# Patient Record
Sex: MALE | Race: WHITE | NOT HISPANIC OR LATINO | Employment: OTHER | URBAN - METROPOLITAN AREA
[De-identification: names, ages, dates, MRNs, and addresses within clinical notes are randomized per-mention and may not be internally consistent; named-entity substitution may affect disease eponyms.]

---

## 2017-03-31 DIAGNOSIS — K21.00 GASTROESOPHAGEAL REFLUX DISEASE WITH ESOPHAGITIS: ICD-10-CM

## 2017-04-03 RX ORDER — DEXLANSOPRAZOLE 60 MG/1
CAPSULE, DELAYED RELEASE ORAL
Qty: 30 CAPSULE | Refills: 0 | Status: SHIPPED | OUTPATIENT
Start: 2017-04-03 | End: 2017-04-28 | Stop reason: SDUPTHER

## 2017-04-28 DIAGNOSIS — K21.00 GASTROESOPHAGEAL REFLUX DISEASE WITH ESOPHAGITIS: ICD-10-CM

## 2017-04-28 RX ORDER — DEXLANSOPRAZOLE 60 MG/1
60 CAPSULE, DELAYED RELEASE ORAL DAILY
Qty: 30 CAPSULE | Refills: 0 | Status: SHIPPED | OUTPATIENT
Start: 2017-04-28 | End: 2017-05-26 | Stop reason: SDUPTHER

## 2017-05-26 DIAGNOSIS — K21.00 GASTROESOPHAGEAL REFLUX DISEASE WITH ESOPHAGITIS: ICD-10-CM

## 2017-05-26 RX ORDER — DEXLANSOPRAZOLE 60 MG/1
CAPSULE, DELAYED RELEASE ORAL
Qty: 30 CAPSULE | Refills: 4 | Status: SHIPPED | OUTPATIENT
Start: 2017-05-26 | End: 2017-11-29 | Stop reason: SDUPTHER

## 2017-07-18 ENCOUNTER — OFFICE VISIT (OUTPATIENT)
Dept: INTERNAL MEDICINE | Facility: CLINIC | Age: 57
End: 2017-07-18

## 2017-07-18 ENCOUNTER — HOSPITAL ENCOUNTER (OUTPATIENT)
Dept: GENERAL RADIOLOGY | Facility: HOSPITAL | Age: 57
Discharge: HOME | End: 2017-07-18
Admitting: INTERNAL MEDICINE

## 2017-07-18 VITALS
OXYGEN SATURATION: 97 % | DIASTOLIC BLOOD PRESSURE: 80 MMHG | SYSTOLIC BLOOD PRESSURE: 134 MMHG | HEART RATE: 81 BPM | BODY MASS INDEX: 35 KG/M2 | WEIGHT: 223 LBS | HEIGHT: 67 IN

## 2017-07-18 DIAGNOSIS — M54.50 CHRONIC RIGHT-SIDED LOW BACK PAIN WITHOUT SCIATICA: ICD-10-CM

## 2017-07-18 DIAGNOSIS — G89.29 CHRONIC RIGHT-SIDED LOW BACK PAIN WITHOUT SCIATICA: ICD-10-CM

## 2017-07-18 DIAGNOSIS — G57.12 MERALGIA PARESTHETICA OF LEFT SIDE: Primary | ICD-10-CM

## 2017-07-18 DIAGNOSIS — K21.00 GASTROESOPHAGEAL REFLUX DISEASE WITH ESOPHAGITIS: Chronic | ICD-10-CM

## 2017-07-18 DIAGNOSIS — Z11.59 NEED FOR HEPATITIS C SCREENING TEST: ICD-10-CM

## 2017-07-18 PROCEDURE — 99214 OFFICE O/P EST MOD 30 MIN: CPT | Performed by: INTERNAL MEDICINE

## 2017-07-18 PROCEDURE — 72110 X-RAY EXAM L-2 SPINE 4/>VWS: CPT

## 2017-07-18 NOTE — PROGRESS NOTES
07/18/2017    Patient Information  Tito Jackson                                                                                          [address]  [facility] [address]      1960  [phone]      Chief Complaint:     Complaining of numbness and tingling of the left thigh.  Complaining of lower back pain.    History of Present Illness:    Patient with a history of esophageal reflux with esophagitis, dyslipidemia, environmental allergies and asthma, migraine headaches.  He presents today with what appears to be 2 unrelated issues and this will be described in detail below.  Past medical history reviewed and updated where necessary including health maintenance parameters.  This reveals she needs a TDaP which we will do today.  He also needs hepatitis C screening which we will add to his lab work for his physical appointment in October.    The history regarding left meralgia paresthetica and chronic lower back pain:    07/18/2017--patient presents with approximately 3-4 month history of numbness and paresthesias involving his left thigh anterior laterally and a classic distribution consistent with meralgia paresthetica.  He has been having some lower back pain as well and was concerned that he might have a pinched nerve from that.  His son has been ill in the hospital and he's been sleeping at the hospital under less than ideal conditions.  I gave him some information regarding meralgia paresthetica and recommended weight loss.  This should help his back problem as well.  In regards to his chronic lower back pain, it is more right sided and described as a pinching sensation and is somewhat positional.  He can find certain positions which seemed to provide some relief and other positions and activities seem to aggravate it, particularly prolonged standing, walking, or playing golf.  He does not give a history consistent with radiculopathy.  I think weight loss would be the first course of  action.  Physical therapy is also another option.  In regards to medication he may benefit from a nonsteroidal.  He does have an esophageal reflux and takes Dexilant and we could temporarily discontinue that and use Vimovo 500/20, one by mouth twice a day.  Patient wants to defer therapy for the present.  I have asked him to give me a call should he change his mind.  We will obtain x-ray of the lumbar spine to assess for degenerative disc disease/arthritis.  I do feel that his left-sided symptoms are related to pressure on the lateral femoral cutaneous nerve and not coming from the spine itself.  However, if symptoms persist and certainly if they worsen, we may need to pursue further evaluation such as an MRI.    Review of Systems   Constitution: Negative.   HENT: Negative.    Eyes: Negative.    Cardiovascular: Negative.    Respiratory: Negative.    Endocrine: Negative.    Hematologic/Lymphatic: Negative.    Skin: Negative.    Musculoskeletal: Positive for back pain.   Gastrointestinal: Negative.    Genitourinary: Negative.    Neurological: Positive for numbness and paresthesias.   Psychiatric/Behavioral: Negative.    Allergic/Immunologic: Negative.        Active Problems:    Patient Active Problem List   Diagnosis   • Allergic rhinitis   • Diverticulosis of colon   • Dyslipidemia   • Gastroesophageal reflux disease with esophagitis   • Impaired fasting glucose   • Migraine headaches   • Mild intermittent asthma   • Multiple environmental allergies   • Vitamin D deficiency   • Therapeutic drug monitoring   • Routine physical examination   • Meralgia paresthetica of left side   • Chronic right-sided low back pain without sciatica         Past Medical History:   Diagnosis Date   • History of actinic keratosis 12/15/2008    12/15/2008--patient had a total of 7 lesions removed from his face and clavicle. Pathology returned seborrheic keratoses inflamed on the clavicle.   • History of cardiovascular stress test  04/29/2011 04/29/2011--maximum Noel protocol treadmill is negative for ischemia.   • History of chest x-ray 12/01/2006 12/01/2006--chest x-ray revealed lungs moderately well expanded and clear except for a tiny calcified granuloma in the lower left lung. Heart hilar structures are normal and no acute abnormality seen.   • History of esophagogastroduodenoscopy 02/27/2009 02/27/2009--EGD revealed LA grade a esophagitis with no evidence of bleeding. Biopsies taken. A small hiatal hernia was present. Diffuse mildly erythematous mucosa with no bleeding was found in the stomach. Biopsies taken. Biopsy sent for H. pylori. No gross lesions were noted in the entire duodenum. Random gastric biopsies returned fragments of unremarkablle gastric mucosa. Hyperplastic polypoid    • History of herpes genitalis Approximately 2001    Approximately 2001--patient had an initial outbreak of genital herpes and has not had a recurrent since.   • History of pneumonia 2002 08/29/2002--bronchoscopy was performed with brushings and biopsies which were negative for malignant cells. Watching showed only normal respiratory breana. AFB and fungal smears negative. The thoracic surgeon wanted to do a VATS procedure and patient refused. His adenopathy as well as infiltrate subsequently resolved. No further recurrence.  08/09/2002--CT scan of the chest performed for abnormal c         Past Surgical History:   Procedure Laterality Date   • APPENDECTOMY  Childhood    As a child   • BRONCHOSCOPY  08/29/2002 08/29/2002--bronchoscopy was performed with brushings and biopsies which were negative for malignant cells. Watching showed only normal respiratory breana. AFB and fungal smears negative. The thoracic surgeon wanted to do a VATS procedure and patient refused. His adenopathy as well as infiltrate subsequently resolved. No further recurrence.  08/09/2002--CT scan of the chest performed for abnormal c   • COLONOSCOPY  02/27/2009     02/27/2009--colonoscopy reveals multiple large and small mouth diverticula in sigmoid colon. The rest of the colon was normal.   • DACRYOCYSTORHINOSTOMY Right 07/12/2012 07/12/2012--dacryocystorhinostomy of the right eye with silicone intubation of lacrimal drainage system. Patient's symptoms have totally resolved   • ESOPHAGOSCOPY / EGD  02/27/2009 02/27/2009--EGD revealed LA grade a esophagitis with no evidence of bleeding. Biopsies taken. A small hiatal hernia was present. Diffuse mildly erythematous mucosa with no bleeding was found in the stomach. Biopsies taken. Biopsy sent for H. pylori. No gross lesions were noted in the entire duodenum. Random gastric biopsies returned fragments of unremarkablle gastric mucosa. Hyperplastic polypoid    • SKIN SURGERY  12/15/2008    12/15/2008--patient had a total of 7 lesions removed from his face and clavicle. Pathology returned seborrheic keratoses inflamed on the clavicle.   • TONSILLECTOMY  Childhood    As a child         No Known Allergies        Current Outpatient Prescriptions:   •  albuterol (PROVENTIL HFA;VENTOLIN HFA) 108 (90 BASE) MCG/ACT inhaler, Proventil  (90 Base) MCG/ACT Inhalation Aerosol Solution; Patient Sig: Proventil  (90 Base) MCG/ACT Inhalation Aerosol Solution 1-2 puffs every 4 hours when necessary asthma; 1; 5; 23-Jan-2014; Active, Disp: , Rfl:   •  DEXILANT 60 MG capsule, TAKE 1 CAPSULE BY MOUTH DAILY., Disp: 30 capsule, Rfl: 4      Family History   Problem Relation Age of Onset   • Diabetes Father      Type 2         Social History     Social History   • Marital status:      Spouse name: N/A   • Number of children: N/A   • Years of education: N/A     Occupational History   • // for [employer]      Social History Main Topics   • Smoking status: Never Smoker   • Smokeless tobacco: Never Used   • Alcohol use Yes      Comment: Minimum   • Drug use: No   • Sexual activity: Yes     Partners:  "Female     Other Topics Concern   • Not on file     Social History Narrative         Vitals:    07/18/17 0730   BP: 134/80   BP Location: Right arm   Patient Position: Sitting   Pulse: 81   SpO2: 97%   Weight: 223 lb (101 kg)   Height: 67\" (170.2 cm)          Physical Exam:    General: Alert and oriented x 3. Obese.  No acute distress.  Normal affect.  HEENT: Pupils equal, round, reactive to light; extraocular movements intact; sclerae nonicteric; pharynx, ear canals and TMs normal.  Neck: Without JVD, thyromegaly, bruit, or adenopathy.  Lungs: Clear to auscultation in all fields.  Heart: Regular rate and rhythm without murmur, rub, gallop, or click.  Abdomen: Soft, nontender, without hepatosplenomegaly or hernia.  Bowel sounds normal.  : Deferred.  Rectal: Deferred.  Extremities: Without clubbing, cyanosis, edema, or pulse deficit.  Neurologic: Intact without focal deficit.  Normal station and gait observed during ingress and egress from the examination room.  Skin: Without significant lesion.  Straight leg raising is negative bilaterally.  Musculoskeletal: Unremarkable.      Lab/other results:      Assessment/Plan:     Diagnosis Plan   1. Meralgia paresthetica of left side     2. Chronic right-sided low back pain without sciatica     3. Gastroesophageal reflux disease with esophagitis         Patient presents with a history that is classic for meralgia paresthetica.  He also has chronic lower back pain which is somewhat complicating the clinical picture but I do think that these are 2 separate problems.  I don't feel this represents a lumbar radiculopathy.  At any rate, radiographic imaging of the lumbar spine is indicated.  I think patient would benefit from nonsteroidal anti-inflammatory agent but he does have esophageal reflux with a history of esophagitis.    Plan is as follows: X-ray lumbar spine.  Recommend weight loss.  Vimovo 500/20, one by mouth twice a day.  Patient can hold the Dexilant while taking " the Vimovo although it certainly wouldn't hurt anything he continued it.  He will follow-up on the phone for the results of the x-rays.  I've asked him to contact me for any worsening of his symptoms or any significant changes.  Otherwise, we will see him in October for his annual exam.  If he decides to do physical therapy and give me a call and we can place the referral.  On second thought, I went ahead and gave him a hand written prescription for physical therapy and that way he can make his own appointment if he decides to do so.          Procedures

## 2017-10-17 DIAGNOSIS — Z11.59 NEED FOR HEPATITIS C SCREENING TEST: ICD-10-CM

## 2017-10-20 LAB
25(OH)D3+25(OH)D2 SERPL-MCNC: 17.1 NG/ML (ref 30–100)
ALBUMIN SERPL-MCNC: 4.5 G/DL (ref 3.5–5.2)
ALBUMIN/GLOB SERPL: 1.7 G/DL
ALP SERPL-CCNC: 49 U/L (ref 39–117)
ALT SERPL-CCNC: 32 U/L (ref 1–41)
APPEARANCE UR: CLEAR
AST SERPL-CCNC: 25 U/L (ref 1–40)
BILIRUB SERPL-MCNC: 0.4 MG/DL (ref 0.1–1.2)
BILIRUB UR QL STRIP: NEGATIVE
BUN SERPL-MCNC: 16 MG/DL (ref 6–20)
BUN/CREAT SERPL: 13 (ref 7–25)
CALCIUM SERPL-MCNC: 9.5 MG/DL (ref 8.6–10.5)
CHLORIDE SERPL-SCNC: 103 MMOL/L (ref 98–107)
CHOLEST SERPL-MCNC: 145 MG/DL (ref 100–199)
CO2 SERPL-SCNC: 23.9 MMOL/L (ref 22–29)
COLOR UR: YELLOW
CREAT SERPL-MCNC: 1.23 MG/DL (ref 0.76–1.27)
ERYTHROCYTE [DISTWIDTH] IN BLOOD BY AUTOMATED COUNT: 14 % (ref 11.5–14.5)
GFR SERPLBLD CREATININE-BSD FMLA CKD-EPI: 60 ML/MIN/1.73
GFR SERPLBLD CREATININE-BSD FMLA CKD-EPI: 73 ML/MIN/1.73
GLOBULIN SER CALC-MCNC: 2.6 GM/DL
GLUCOSE SERPL-MCNC: 99 MG/DL (ref 65–99)
GLUCOSE UR QL: NEGATIVE
HBA1C MFR BLD: 5.42 % (ref 4.8–5.6)
HCT VFR BLD AUTO: 44.1 % (ref 40.4–52.2)
HCV AB S/CO SERPL IA: 0.1 S/CO RATIO (ref 0–0.9)
HDL SERPL-SCNC: 25.8 UMOL/L
HDLC SERPL-MCNC: 29 MG/DL
HGB BLD-MCNC: 14.5 G/DL (ref 13.7–17.6)
HGB UR QL STRIP: NEGATIVE
KETONES UR QL STRIP: NEGATIVE
LDL SERPL QN: 19.6 NM
LDL SERPL-SCNC: 1125 NMOL/L
LDL SMALL SERPL-SCNC: 877 NMOL/L
LDLC SERPL CALC-MCNC: 58 MG/DL (ref 0–99)
LEUKOCYTE ESTERASE UR QL STRIP: NEGATIVE
LP-IR SCORE SERPL: 75
MCH RBC QN AUTO: 29.4 PG (ref 27–32.7)
MCHC RBC AUTO-ENTMCNC: 32.9 G/DL (ref 32.6–36.4)
MCV RBC AUTO: 89.3 FL (ref 79.8–96.2)
NITRITE UR QL STRIP: NEGATIVE
PH UR STRIP: 6 [PH] (ref 5–8)
PLATELET # BLD AUTO: 197 10*3/MM3 (ref 140–500)
POTASSIUM SERPL-SCNC: 4.7 MMOL/L (ref 3.5–5.2)
PROT SERPL-MCNC: 7.1 G/DL (ref 6–8.5)
PROT UR QL STRIP: NEGATIVE
PSA SERPL-MCNC: 0.79 NG/ML (ref 0–4)
RBC # BLD AUTO: 4.94 10*6/MM3 (ref 4.6–6)
SODIUM SERPL-SCNC: 142 MMOL/L (ref 136–145)
SP GR UR: 1.02 (ref 1–1.03)
T3FREE SERPL-MCNC: 3.3 PG/ML (ref 2–4.4)
T4 FREE SERPL-MCNC: 1.19 NG/DL (ref 0.93–1.7)
TRIGL SERPL-MCNC: 289 MG/DL (ref 0–149)
TSH SERPL DL<=0.005 MIU/L-ACNC: 1.7 MIU/ML (ref 0.27–4.2)
UROBILINOGEN UR STRIP-MCNC: NORMAL MG/DL
WBC # BLD AUTO: 6.17 10*3/MM3 (ref 4.5–10.7)

## 2017-10-25 ENCOUNTER — OFFICE VISIT (OUTPATIENT)
Dept: INTERNAL MEDICINE | Facility: CLINIC | Age: 57
End: 2017-10-25

## 2017-10-25 VITALS
WEIGHT: 222.4 LBS | SYSTOLIC BLOOD PRESSURE: 122 MMHG | HEIGHT: 67 IN | BODY MASS INDEX: 34.91 KG/M2 | HEART RATE: 73 BPM | OXYGEN SATURATION: 99 % | DIASTOLIC BLOOD PRESSURE: 80 MMHG

## 2017-10-25 DIAGNOSIS — M54.50 CHRONIC RIGHT-SIDED LOW BACK PAIN WITHOUT SCIATICA: Chronic | ICD-10-CM

## 2017-10-25 DIAGNOSIS — G89.29 CHRONIC RIGHT-SIDED LOW BACK PAIN WITHOUT SCIATICA: Chronic | ICD-10-CM

## 2017-10-25 DIAGNOSIS — G57.12 MERALGIA PARESTHETICA OF LEFT SIDE: Chronic | ICD-10-CM

## 2017-10-25 DIAGNOSIS — Z00.00 ROUTINE PHYSICAL EXAMINATION: Primary | ICD-10-CM

## 2017-10-25 DIAGNOSIS — J45.20 MILD INTERMITTENT ASTHMA WITHOUT COMPLICATION: Chronic | ICD-10-CM

## 2017-10-25 DIAGNOSIS — R73.01 IMPAIRED FASTING GLUCOSE: Chronic | ICD-10-CM

## 2017-10-25 DIAGNOSIS — E78.5 DYSLIPIDEMIA: Chronic | ICD-10-CM

## 2017-10-25 DIAGNOSIS — Z91.09 MULTIPLE ENVIRONMENTAL ALLERGIES: Chronic | ICD-10-CM

## 2017-10-25 DIAGNOSIS — Z51.81 THERAPEUTIC DRUG MONITORING: ICD-10-CM

## 2017-10-25 DIAGNOSIS — IMO0002 CHRONIC MIGRAINE: Chronic | ICD-10-CM

## 2017-10-25 DIAGNOSIS — K21.00 GASTROESOPHAGEAL REFLUX DISEASE WITH ESOPHAGITIS: Chronic | ICD-10-CM

## 2017-10-25 DIAGNOSIS — E55.9 VITAMIN D DEFICIENCY: Chronic | ICD-10-CM

## 2017-10-25 PROCEDURE — 99396 PREV VISIT EST AGE 40-64: CPT | Performed by: INTERNAL MEDICINE

## 2017-10-25 NOTE — PROGRESS NOTES
10/25/2017    Patient Information  Tito Jackson                                                                                          [address]  [facility] [id number]      1960  [phone]      Chief Complaint:     Routine annual physical examination and follow-up lab work.  No new acute complaints.    History of Present Illness:    Patient with a history of mild impaired fasting glucose, dyslipidemia, esophageal reflux with esophagitis, migraine headaches, mild intermittent asthma and allergy, chronic lower back pain and left meralgia paresthetica.  He presents today for his routine annual exam and follow-up lab work.  He is tolerating his current medications well and has no new acute complaints.  Past medical history reviewed and updated where necessary including health maintenance parameters.  This reveals he is up-to-date.    Review of Systems   Constitution: Negative.   HENT: Negative.    Eyes: Negative.    Cardiovascular: Negative.    Respiratory: Negative.    Endocrine: Negative.    Hematologic/Lymphatic: Negative.    Skin: Negative.    Musculoskeletal: Negative.    Gastrointestinal: Negative.    Genitourinary: Negative.    Neurological: Negative.    Psychiatric/Behavioral: Negative.    Allergic/Immunologic: Negative.        Active Problems:    Patient Active Problem List   Diagnosis   • Allergic rhinitis   • Diverticulosis of colon   • Dyslipidemia   • Gastroesophageal reflux disease with esophagitis   • Impaired fasting glucose   • Migraine headaches   • Mild intermittent asthma   • Multiple environmental allergies   • Vitamin D deficiency   • Therapeutic drug monitoring   • Routine physical examination   • Meralgia paresthetica of left side   • Chronic right-sided low back pain without sciatica         Past Medical History:   Diagnosis Date   • Allergic rhinitis 2/23/2016    Patient had remote allergy testing as a child but never received immunotherapy except shots for poison  ivy.   • Diverticulosis of colon 2/27/2009 02/27/2009--colonoscopy reveals multiple large and small mouth diverticula in sigmoid colon. The rest of the colon was normal.   • Dyslipidemia 8/20/2015 08/20/2015--NMR reveals a total cholesterol 140. Triglycerides elevated at 203. LDL particle number excellent at 965. Small LDL particle number elevated at 747. HDL particle number low at 26.0.   • Gastroesophageal reflux disease with esophagitis 2/27/2009 02/27/2009--EGD revealed LA grade a esophagitis with no evidence of bleeding. Biopsies taken. A small hiatal hernia was present. Diffuse mildly erythematous mucosa with no bleeding was found in the stomach. Biopsies taken. Biopsy sent for H. pylori. No gross lesions were noted in the entire duodenum. Random gastric biopsies returned fragments of unremarkablle gastric mucosa. Hyperplastic polypoid gastric mucosa, fundic type. H pylori negative. Distal esophagitis biopsies revealed focus of acute inflammation with evidence of mucosal erosion/ulcer. Strips of squamous epithelium with focal parakeratosis. Special stains for fungi were negative.   • History of actinic keratosis 12/15/2008    12/15/2008--patient had a total of 7 lesions removed from his face and clavicle. Pathology returned seborrheic keratoses inflamed on the clavicle.   • History of cardiovascular stress test 04/29/2011 04/29/2011--maximum Noel protocol treadmill is negative for ischemia.   • History of chest x-ray 12/01/2006 12/01/2006--chest x-ray revealed lungs moderately well expanded and clear except for a tiny calcified granuloma in the lower left lung. Heart hilar structures are normal and no acute abnormality seen.   • History of esophagogastroduodenoscopy 02/27/2009 02/27/2009--EGD revealed LA grade a esophagitis with no evidence of bleeding. Biopsies taken. A small hiatal hernia was present. Diffuse mildly erythematous mucosa with no bleeding was found in the stomach. Biopsies  taken. Biopsy sent for H. pylori. No gross lesions were noted in the entire duodenum. Random gastric biopsies returned fragments of unremarkablle gastric mucosa. Hyperplastic polypoid    • History of herpes genitalis Approximately 2001    Approximately 2001--patient had an initial outbreak of genital herpes and has not had a recurrent since.   • History of pneumonia 2002 08/29/2002--bronchoscopy was performed with brushings and biopsies which were negative for malignant cells. Watching showed only normal respiratory breana. AFB and fungal smears negative. The thoracic surgeon wanted to do a VATS procedure and patient refused. His adenopathy as well as infiltrate subsequently resolved. No further recurrence.  08/09/2002--CT scan of the chest performed for abnormal c   • Impaired fasting glucose 8/20/2015 08/20/2015--serum glucose 109. Recommend diet, weight loss, exercise.   • Migraine headaches 2/23/2016   • Mild intermittent asthma 2/23/2016   • Multiple environmental allergies 2/23/2016    Patient had remote allergy testing as a child but never received immunotherapy except shots for poison ivy.   • Vitamin D deficiency 2/23/2016         Past Surgical History:   Procedure Laterality Date   • APPENDECTOMY  Childhood    As a child   • BRONCHOSCOPY  08/29/2002 08/29/2002--bronchoscopy was performed with brushings and biopsies which were negative for malignant cells. Watching showed only normal respiratory breana. AFB and fungal smears negative. The thoracic surgeon wanted to do a VATS procedure and patient refused. His adenopathy as well as infiltrate subsequently resolved. No further recurrence.  08/09/2002--CT scan of the chest performed for abnormal c   • COLONOSCOPY  02/27/2009 02/27/2009--colonoscopy reveals multiple large and small mouth diverticula in sigmoid colon. The rest of the colon was normal.   • DACRYOCYSTORHINOSTOMY Right 07/12/2012 07/12/2012--dacryocystorhinostomy of the right eye with  "silicone intubation of lacrimal drainage system. Patient's symptoms have totally resolved   • ESOPHAGOSCOPY / EGD  02/27/2009 02/27/2009--EGD revealed LA grade a esophagitis with no evidence of bleeding. Biopsies taken. A small hiatal hernia was present. Diffuse mildly erythematous mucosa with no bleeding was found in the stomach. Biopsies taken. Biopsy sent for H. pylori. No gross lesions were noted in the entire duodenum. Random gastric biopsies returned fragments of unremarkablle gastric mucosa. Hyperplastic polypoid    • SKIN SURGERY  12/15/2008    12/15/2008--patient had a total of 7 lesions removed from his face and clavicle. Pathology returned seborrheic keratoses inflamed on the clavicle.   • TONSILLECTOMY  Childhood    As a child         No Known Allergies        Current Outpatient Prescriptions:   •  albuterol (PROVENTIL HFA;VENTOLIN HFA) 108 (90 BASE) MCG/ACT inhaler, Proventil  (90 Base) MCG/ACT Inhalation Aerosol Solution; Patient Sig: Proventil  (90 Base) MCG/ACT Inhalation Aerosol Solution 1-2 puffs every 4 hours when necessary asthma; 1; 5; 23-Jan-2014; Active, Disp: , Rfl:   •  DEXILANT 60 MG capsule, TAKE 1 CAPSULE BY MOUTH DAILY., Disp: 30 capsule, Rfl: 4      Family History   Problem Relation Age of Onset   • Diabetes Father      Type 2         Social History     Social History   • Marital status:      Spouse name: N/A   • Number of children: N/A   • Years of education: N/A     Occupational History   • // for [employer]      Social History Main Topics   • Smoking status: Never Smoker   • Smokeless tobacco: Never Used   • Alcohol use Yes      Comment: Minimum   • Drug use: No   • Sexual activity: Yes     Partners: Female     Other Topics Concern   • Not on file     Social History Narrative         Vitals:    10/25/17 0822   BP: 122/80   Pulse: 73   SpO2: 99%   Weight: 222 lb 6.4 oz (101 kg)   Height: 67\" (170.2 cm)          Physical " Exam:    General: Alert and oriented x 3, with appropriate affect; no acute distress.  HEENT: pupils equal, round, and reactive to light; extraocular movements intact; sclera nonicteric; nasal mucosa normal; pharynx normal; tympanic membranes and ear canals normal.  Neck: without JVD, thyromegaly, bruit, or adenopathy.  Lungs: clear to auscultation in all fields.  Heart: auscultation reveals regular rate and rhythm without murmur, rub, gallop, or click.  Abdomen: is soft and nontender, without hepato-splenomegaly, mass or hernia. Normal bowel sounds; .  Urologic exam: reveals normal male genitalia without testicular mass or penile/scrotal lesion.  Digital rectal exam and Prostate: deferred.  Extremities: are without clubbing, cyanosis, or edema.  Vascular: no signs of peripheral arterial disease or venous insufficiency/varicosities.  Neurological: intact without focal deficit, including cranial and peripheral nerves.  Station and gait observed to be normal during ingress and egress from the examination area.  Sensation and deep tendon reflexes tested if clinically indicated and are normal.  Musculoskeletal: exam is normal, without signs of synovitis, significant degeneration or deformity. Skin examination: without rash or significant lesions.      Lab/other results:    NMR reveals total cholesterol 145.  Triglycerides are mildly elevated at 289.  LDL particle number mildly elevated at 1125.  Small LDL particle number elevated at 877.  HDL particle number is low at 25.8.  CMP normal.  CBC normal.  Hemoglobin A1c 5.42.  Thyroid function tests are normal.  PSA is excellent at 0.789.  Vitamin D low at 70.1.  Hepatitis C antibody screening is negative.    Assessment/Plan:     Diagnosis Plan   1. Routine physical examination     2. Impaired fasting glucose     3. Dyslipidemia     4. Gastroesophageal reflux disease with esophagitis     5. Migraine headaches     6. Mild intermittent asthma without complication     7.  Multiple environmental allergies     8. Meralgia paresthetica of left side     9. Chronic right-sided low back pain without sciatica     10. Vitamin D deficiency     11. Therapeutic drug monitoring         Patient presents with essentially normal annual exam except following issues: He is very mild impaired fasting glucose that does not require medication.  He has dyslipidemia consisting of elevated LDL particle number and elevated small LDL particle number associated with high triglycerides and low HDL particle number.  Patient would rather not take medication at this time and instead would like to work on diet and weight loss.  I do think that this is reasonable given the mild severity of the dyslipidemia.  Reflux controlled with Dexilant.  Migraine headaches and asthma as well as allergies haven't been mild and stable.  Neuralgia paresthetica of the left thigh is intermittent and not severe.  His back pain actually is better.  He does have a vitamin D deficiency and should start vitamin D supplementation.    Plan is as follows: Vitamin D 5000 units per day.  No other changes.  Patient will follow-up in one year for his annual exam or follow-up as needed.      Procedures

## 2017-11-29 DIAGNOSIS — K21.00 GASTROESOPHAGEAL REFLUX DISEASE WITH ESOPHAGITIS: ICD-10-CM

## 2017-11-29 RX ORDER — DEXLANSOPRAZOLE 60 MG/1
CAPSULE, DELAYED RELEASE ORAL
Qty: 30 CAPSULE | Refills: 10 | Status: SHIPPED | OUTPATIENT
Start: 2017-11-29 | End: 2017-11-30 | Stop reason: ALTCHOICE

## 2017-11-30 RX ORDER — OMEPRAZOLE 40 MG/1
40 CAPSULE, DELAYED RELEASE ORAL DAILY
Qty: 30 CAPSULE | Refills: 5 | Status: SHIPPED | OUTPATIENT
Start: 2017-11-30 | End: 2018-08-07 | Stop reason: SDUPTHER

## 2018-08-07 RX ORDER — OMEPRAZOLE 40 MG/1
40 CAPSULE, DELAYED RELEASE ORAL DAILY
Qty: 90 CAPSULE | Refills: 1 | Status: SHIPPED | OUTPATIENT
Start: 2018-08-07

## 2018-10-25 ENCOUNTER — RESULTS ENCOUNTER (OUTPATIENT)
Dept: INTERNAL MEDICINE | Facility: CLINIC | Age: 58
End: 2018-10-25

## 2018-10-25 DIAGNOSIS — R73.01 IMPAIRED FASTING GLUCOSE: Chronic | ICD-10-CM

## 2018-10-25 DIAGNOSIS — E55.9 VITAMIN D DEFICIENCY: Chronic | ICD-10-CM

## 2018-10-25 DIAGNOSIS — Z00.00 ROUTINE PHYSICAL EXAMINATION: ICD-10-CM

## 2018-10-25 DIAGNOSIS — E78.5 DYSLIPIDEMIA: Chronic | ICD-10-CM

## 2019-04-13 LAB
25(OH)D3+25(OH)D2 SERPL-MCNC: 20.3 NG/ML (ref 30–100)
ALBUMIN SERPL-MCNC: 5.2 G/DL (ref 3.5–5.2)
ALBUMIN/GLOB SERPL: 2.3 G/DL
ALP SERPL-CCNC: 56 U/L (ref 39–117)
ALT SERPL-CCNC: 18 U/L (ref 1–41)
APPEARANCE UR: CLEAR
AST SERPL-CCNC: 16 U/L (ref 1–40)
BILIRUB SERPL-MCNC: 0.7 MG/DL (ref 0.2–1.2)
BILIRUB UR QL STRIP: NEGATIVE
BUN SERPL-MCNC: 13 MG/DL (ref 8–23)
BUN/CREAT SERPL: 11 (ref 7–25)
CALCIUM SERPL-MCNC: 10 MG/DL (ref 8.6–10.5)
CHLORIDE SERPL-SCNC: 101 MMOL/L (ref 98–107)
CHOLEST SERPL-MCNC: 160 MG/DL (ref 100–199)
CK SERPL-CCNC: 152 U/L (ref 20–200)
CO2 SERPL-SCNC: 26.2 MMOL/L (ref 22–29)
COLOR UR: YELLOW
CREAT SERPL-MCNC: 1.18 MG/DL (ref 0.76–1.27)
ERYTHROCYTE [DISTWIDTH] IN BLOOD BY AUTOMATED COUNT: 13.6 % (ref 12.3–15.4)
GLOBULIN SER CALC-MCNC: 2.3 GM/DL
GLUCOSE SERPL-MCNC: 84 MG/DL (ref 65–99)
GLUCOSE UR QL: NEGATIVE
HBA1C MFR BLD: 5.47 % (ref 4.8–5.6)
HCT VFR BLD AUTO: 46.1 % (ref 37.5–51)
HDL SERPL-SCNC: 24.2 UMOL/L
HDLC SERPL-MCNC: 39 MG/DL
HGB BLD-MCNC: 14.8 G/DL (ref 13–17.7)
HGB UR QL STRIP: NEGATIVE
KETONES UR QL STRIP: ABNORMAL
LDL SERPL QN: 20.7 NM
LDL SERPL-SCNC: 1122 NMOL/L
LDL SMALL SERPL-SCNC: 380 NMOL/L
LDLC SERPL CALC-MCNC: 102 MG/DL (ref 0–99)
LEUKOCYTE ESTERASE UR QL STRIP: NEGATIVE
MCH RBC QN AUTO: 29.1 PG (ref 26.6–33)
MCHC RBC AUTO-ENTMCNC: 32.1 G/DL (ref 31.5–35.7)
MCV RBC AUTO: 90.6 FL (ref 79–97)
NITRITE UR QL STRIP: NEGATIVE
PH UR STRIP: 5.5 [PH] (ref 5–8)
PLATELET # BLD AUTO: 214 10*3/MM3 (ref 140–450)
POTASSIUM SERPL-SCNC: 4.7 MMOL/L (ref 3.5–5.2)
PROT SERPL-MCNC: 7.5 G/DL (ref 6–8.5)
PROT UR QL STRIP: NEGATIVE
PSA SERPL-MCNC: 0.68 NG/ML (ref 0–4)
RBC # BLD AUTO: 5.09 10*6/MM3 (ref 4.14–5.8)
SODIUM SERPL-SCNC: 142 MMOL/L (ref 136–145)
SP GR UR: 1.03 (ref 1–1.03)
T3FREE SERPL-MCNC: 2.7 PG/ML (ref 2–4.4)
T4 FREE SERPL-MCNC: 1.41 NG/DL (ref 0.93–1.7)
TRIGL SERPL-MCNC: 94 MG/DL (ref 0–149)
TSH SERPL DL<=0.005 MIU/L-ACNC: 2.25 MIU/ML (ref 0.27–4.2)
UROBILINOGEN UR STRIP-MCNC: ABNORMAL MG/DL
WBC # BLD AUTO: 5.95 10*3/MM3 (ref 3.4–10.8)

## 2019-04-16 ENCOUNTER — OFFICE VISIT (OUTPATIENT)
Dept: INTERNAL MEDICINE | Facility: CLINIC | Age: 59
End: 2019-04-16

## 2019-04-16 VITALS
DIASTOLIC BLOOD PRESSURE: 88 MMHG | WEIGHT: 196 LBS | OXYGEN SATURATION: 99 % | HEART RATE: 76 BPM | HEIGHT: 68 IN | BODY MASS INDEX: 29.7 KG/M2 | SYSTOLIC BLOOD PRESSURE: 128 MMHG

## 2019-04-16 DIAGNOSIS — R73.01 IMPAIRED FASTING GLUCOSE: Chronic | ICD-10-CM

## 2019-04-16 DIAGNOSIS — Z91.09 MULTIPLE ENVIRONMENTAL ALLERGIES: Chronic | ICD-10-CM

## 2019-04-16 DIAGNOSIS — Z00.00 ROUTINE PHYSICAL EXAMINATION: Primary | ICD-10-CM

## 2019-04-16 DIAGNOSIS — E55.9 VITAMIN D DEFICIENCY: Chronic | ICD-10-CM

## 2019-04-16 DIAGNOSIS — Z12.11 COLON CANCER SCREENING: ICD-10-CM

## 2019-04-16 DIAGNOSIS — E78.5 DYSLIPIDEMIA: Chronic | ICD-10-CM

## 2019-04-16 DIAGNOSIS — K21.00 GASTROESOPHAGEAL REFLUX DISEASE WITH ESOPHAGITIS: Chronic | ICD-10-CM

## 2019-04-16 DIAGNOSIS — IMO0002 CHRONIC MIGRAINE: Chronic | ICD-10-CM

## 2019-04-16 DIAGNOSIS — J45.20 MILD INTERMITTENT ASTHMA WITHOUT COMPLICATION: Chronic | ICD-10-CM

## 2019-04-16 PROBLEM — M54.50 CHRONIC RIGHT-SIDED LOW BACK PAIN WITHOUT SCIATICA: Chronic | Status: RESOLVED | Noted: 2017-07-18 | Resolved: 2019-04-16

## 2019-04-16 PROBLEM — G57.12 MERALGIA PARESTHETICA OF LEFT SIDE: Chronic | Status: RESOLVED | Noted: 2017-07-18 | Resolved: 2019-04-16

## 2019-04-16 PROBLEM — G89.29 CHRONIC RIGHT-SIDED LOW BACK PAIN WITHOUT SCIATICA: Chronic | Status: RESOLVED | Noted: 2017-07-18 | Resolved: 2019-04-16

## 2019-04-16 PROCEDURE — 99396 PREV VISIT EST AGE 40-64: CPT | Performed by: INTERNAL MEDICINE

## 2019-04-16 NOTE — PROGRESS NOTES
04/16/2019    Patient Information  Tito Jackson                                                                                          [address]  [facility] [address]      1960  [unfilled]  There is no work phone number on file.    Chief Complaint:     Routine physical exam and follow-up lab work.  No new acute complaints.    History of Present Illness:    Patient with a history of impaired fasting glucose, dyslipidemia, esophageal reflux, migraine headaches, allergy and asthma.  He presents today for his routine annual exam.  He currently feels well and has no new acute complaints.  His past medical history reviewed and updated were necessary including health maintenance parameters.  This reveals he is due for colonoscopy.  We also had a discussion about the new shingles vaccine.    Review of Systems   Constitution: Negative.   HENT: Negative.    Eyes: Negative.    Cardiovascular: Negative.    Respiratory: Negative.    Endocrine: Negative.    Hematologic/Lymphatic: Negative.    Skin: Negative.    Musculoskeletal: Negative.    Gastrointestinal: Negative.    Genitourinary: Negative.    Neurological: Negative.    Psychiatric/Behavioral: Negative.    Allergic/Immunologic: Negative.        Active Problems:    Patient Active Problem List   Diagnosis   • Allergic rhinitis   • Diverticulosis of colon   • Dyslipidemia   • Gastroesophageal reflux disease with esophagitis   • Impaired fasting glucose   • Migraine headaches   • Mild intermittent asthma   • Multiple environmental allergies   • Vitamin D deficiency   • Therapeutic drug monitoring   • Routine physical examination         Past Medical History:   Diagnosis Date   • Allergic rhinitis 2/23/2016    Patient had remote allergy testing as a child but never received immunotherapy except shots for poison ivy.   • Diverticulosis of colon 2/27/2009 02/27/2009--colonoscopy reveals multiple large and small mouth diverticula in sigmoid colon. The  rest of the colon was normal.   • Dyslipidemia 8/20/2015 08/20/2015--NMR reveals a total cholesterol 140. Triglycerides elevated at 203. LDL particle number excellent at 965. Small LDL particle number elevated at 747. HDL particle number low at 26.0.   • Gastroesophageal reflux disease with esophagitis 2/27/2009 02/27/2009--EGD revealed LA grade a esophagitis with no evidence of bleeding. Biopsies taken. A small hiatal hernia was present. Diffuse mildly erythematous mucosa with no bleeding was found in the stomach. Biopsies taken. Biopsy sent for H. pylori. No gross lesions were noted in the entire duodenum. Random gastric biopsies returned fragments of unremarkablle gastric mucosa. Hyperplastic polypoid gastric mucosa, fundic type. H pylori negative. Distal esophagitis biopsies revealed focus of acute inflammation with evidence of mucosal erosion/ulcer. Strips of squamous epithelium with focal parakeratosis. Special stains for fungi were negative.   • History of herpes genitalis Approximately 2001    Approximately 2001--patient had an initial outbreak of genital herpes and has not had a recurrent since.   • History of pneumonia 2002 08/29/2002--bronchoscopy was performed with brushings and biopsies which were negative for malignant cells. Watching showed only normal respiratory breana. AFB and fungal smears negative. The thoracic surgeon wanted to do a VATS procedure and patient refused. His adenopathy as well as infiltrate subsequently resolved. No further recurrence.  08/09/2002--CT scan of the chest performed for abnormal c   • Impaired fasting glucose 8/20/2015 08/20/2015--serum glucose 109. Recommend diet, weight loss, exercise.   • Migraine headaches 2/23/2016   • Mild intermittent asthma 2/23/2016   • Multiple environmental allergies 2/23/2016    Patient had remote allergy testing as a child but never received immunotherapy except shots for poison ivy.   • Vitamin D deficiency 2/23/2016          Past Surgical History:   Procedure Laterality Date   • APPENDECTOMY  Childhood    As a child   • BRONCHOSCOPY  08/29/2002 08/29/2002--bronchoscopy was performed with brushings and biopsies which were negative for malignant cells. Watching showed only normal respiratory breana. AFB and fungal smears negative. The thoracic surgeon wanted to do a VATS procedure and patient refused. His adenopathy as well as infiltrate subsequently resolved. No further recurrence.  08/09/2002--CT scan of the chest performed for abnormal c   • COLONOSCOPY  02/27/2009 02/27/2009--colonoscopy reveals multiple large and small mouth diverticula in sigmoid colon. The rest of the colon was normal.   • DACRYOCYSTORHINOSTOMY Right 07/12/2012 07/12/2012--dacryocystorhinostomy of the right eye with silicone intubation of lacrimal drainage system. Patient's symptoms have totally resolved   • ESOPHAGOSCOPY / EGD  02/27/2009 02/27/2009--EGD revealed LA grade a esophagitis with no evidence of bleeding. Biopsies taken. A small hiatal hernia was present. Diffuse mildly erythematous mucosa with no bleeding was found in the stomach. Biopsies taken. Biopsy sent for H. pylori. No gross lesions were noted in the entire duodenum. Random gastric biopsies returned fragments of unremarkablle gastric mucosa. Hyperplastic polypoid    • SKIN SURGERY  12/15/2008    12/15/2008--patient had a total of 7 lesions removed from his face and clavicle. Pathology returned seborrheic keratoses inflamed on the clavicle.   • TONSILLECTOMY  Childhood    As a child         No Known Allergies        Current Outpatient Medications:   •  albuterol (PROVENTIL HFA;VENTOLIN HFA) 108 (90 BASE) MCG/ACT inhaler, Proventil  (90 Base) MCG/ACT Inhalation Aerosol Solution; Patient Sig: Proventil  (90 Base) MCG/ACT Inhalation Aerosol Solution 1-2 puffs every 4 hours when necessary asthma; 1; 5; 23-Jan-2014; Active, Disp: , Rfl:   •  Cholecalciferol (VITAMIN D3)  "5000 units capsule capsule, 1 by mouth daily as directed, Disp: 30 capsule, Rfl:   •  omeprazole (priLOSEC) 40 MG capsule, Take 1 capsule by mouth Daily., Disp: 90 capsule, Rfl: 1      Family History   Problem Relation Age of Onset   • Diabetes Father         Type 2         Social History     Socioeconomic History   • Marital status:      Spouse name: adan   • Number of children: Not on file   • Years of education: Not on file   • Highest education level: Master's degree (e.g., MA, MS, Janette, MEd, MSW, CAROL)   Occupational History   • Occupation: // for [employer]   Social Needs   • Financial resource strain: Not hard at all   • Food insecurity:     Worry: Never true     Inability: Never true   • Transportation needs:     Medical: No     Non-medical: No   Tobacco Use   • Smoking status: Never Smoker   • Smokeless tobacco: Never Used   Substance and Sexual Activity   • Alcohol use: No     Frequency: Never     Comment: Minimum   • Drug use: No   • Sexual activity: Yes     Partners: Female   Lifestyle   • Physical activity:     Days per week: 3 days     Minutes per session: 20 min   • Stress: Only a little   Relationships   • Social connections:     Talks on phone: Patient refused     Gets together: Patient refused     Attends [employer] service: Patient refused     Active member of club or organization: Patient refused     Attends meetings of clubs or organizations: Patient refused     Relationship status: Patient refused         Vitals:    04/16/19 0706   BP: 128/88   BP Location: Right arm   Pulse: 76   SpO2: 99%   Weight: 88.9 kg (196 lb)   Height: 172.7 cm (68\")          Physical Exam:    General: Alert and oriented x 3, with appropriate affect; no acute distress. Obese.  HEENT: pupils equal, round, and reactive to light; extraocular movements intact; sclera nonicteric; nasal mucosa normal; pharynx normal; tympanic membranes and ear canals normal.  Neck: without JVD, " thyromegaly, bruit, or adenopathy.  Lungs: clear to auscultation in all fields.  Heart: auscultation reveals regular rate and rhythm without murmur, rub, gallop, or click.  Abdomen: is soft and nontender, without hepato-splenomegaly, mass or hernia. Normal bowel sounds; .  Urologic exam: reveals normal male genitalia without testicular mass or penile/scrotal lesion.  Digital rectal exam and Prostate: deferred.  Extremities: are without clubbing, cyanosis, or edema.  Vascular: no signs of peripheral arterial disease or venous insufficiency/varicosities.  Neurological: intact without focal deficit, including cranial and peripheral nerves.  Station and gait observed to be normal during ingress and egress from the examination area.  Sensation and deep tendon reflexes tested if clinically indicated and are normal.  Musculoskeletal: exam is normal, without signs of synovitis, significant degeneration or deformity. Skin examination: without rash or significant lesions.    Lab/other results:    NMR reveals a total cholesterol normal at 160.  Triglycerides normal at 94.  LDL particle number is mildly elevated 1122.  Small LDL particle number excellent 380.  HDL particle number is low at 24.2.  CMP normal.  Urinalysis normal.  CBC normal.  Hemoglobin A1c 5.47.  Thyroid function tests are normal.  PSA is excellent at 0.679.  Vitamin D is low at 20.3.    Assessment/Plan:     Diagnosis Plan   1. Routine physical examination     2. Impaired fasting glucose     3. Dyslipidemia     4. Gastroesophageal reflux disease with esophagitis     5. Migraine headaches     6. Mild intermittent asthma without complication     7. Multiple environmental allergies     8. Vitamin D deficiency       Patient presents with essentially normal annual physical except for the following issues: He has a previous history of impaired fasting glucose that appears to have resolved.  Patient has been on the keto diet and has lost about 30 pounds.  We will  continue to monitor this for a while longer.  Dyslipidemia is actually improved somewhat.  Reflux is not been much of an issue and neither have migraine headaches.  Asthma has been well controlled and allergies are currently not much of an issue.    Plan is as follows: Order for screening colonoscopy.  Encouraged patient take vitamin D 5000 units/day.  No other changes.  Discussed the new shingles vaccine and patient will check with a local drugstore.  Patient will follow-up in 1 year with lab prior or follow-up as needed.        Procedures

## 2019-09-29 ENCOUNTER — PREP FOR SURGERY (OUTPATIENT)
Dept: OTHER | Facility: HOSPITAL | Age: 59
End: 2019-09-29

## 2019-09-29 DIAGNOSIS — Z12.11 SCREEN FOR COLON CANCER: ICD-10-CM

## 2019-09-29 DIAGNOSIS — R11.2 NAUSEA & VOMITING: Primary | ICD-10-CM

## 2019-09-29 DIAGNOSIS — R13.10 DYSPHAGIA: ICD-10-CM

## 2019-10-03 PROBLEM — Z12.11 SCREEN FOR COLON CANCER: Status: ACTIVE | Noted: 2019-10-03

## 2019-10-03 PROBLEM — R13.10 DYSPHAGIA: Status: ACTIVE | Noted: 2019-10-03

## 2019-10-03 PROBLEM — R11.2 NAUSEA & VOMITING: Status: ACTIVE | Noted: 2019-10-03

## 2019-12-05 ENCOUNTER — OFFICE VISIT (OUTPATIENT)
Dept: GASTROENTEROLOGY | Facility: CLINIC | Age: 59
End: 2019-12-05

## 2019-12-05 VITALS
SYSTOLIC BLOOD PRESSURE: 140 MMHG | BODY MASS INDEX: 32.18 KG/M2 | HEIGHT: 67 IN | DIASTOLIC BLOOD PRESSURE: 98 MMHG | WEIGHT: 205 LBS | TEMPERATURE: 98.7 F

## 2019-12-05 DIAGNOSIS — R10.13 DYSPEPSIA: Primary | ICD-10-CM

## 2019-12-05 DIAGNOSIS — R13.10 DYSPHAGIA, UNSPECIFIED TYPE: ICD-10-CM

## 2019-12-05 DIAGNOSIS — Z12.11 ENCOUNTER FOR SCREENING FOR MALIGNANT NEOPLASM OF COLON: ICD-10-CM

## 2019-12-05 PROCEDURE — 99202 OFFICE O/P NEW SF 15 MIN: CPT | Performed by: INTERNAL MEDICINE

## 2019-12-05 NOTE — PROGRESS NOTES
Chief Complaint   Patient presents with   • Heartburn   • Difficulty Swallowing   • Colonoscopy       History of Present Illness:   59 y.o. male who has solids dysphagia for years. Sometimes heartburn. He doesn't take Omeprazole because of fear of dementia and other side effects. No nausea or vomiting. No chest or abdominal pain. No diarrhea or constipation. NO rectal bleeding or melena. Weight stable. Nonsmoker, no ETOH. Wife is pediatrician.    Past Medical History:   Diagnosis Date   • Allergic rhinitis 2/23/2016    Patient had remote allergy testing as a child but never received immunotherapy except shots for poison ivy.   • Diverticulosis of colon 2/27/2009 02/27/2009--colonoscopy reveals multiple large and small mouth diverticula in sigmoid colon. The rest of the colon was normal.   • Dyslipidemia 8/20/2015 08/20/2015--NMR reveals a total cholesterol 140. Triglycerides elevated at 203. LDL particle number excellent at 965. Small LDL particle number elevated at 747. HDL particle number low at 26.0.   • Gastroesophageal reflux disease with esophagitis 2/27/2009 02/27/2009--EGD revealed LA grade a esophagitis with no evidence of bleeding. Biopsies taken. A small hiatal hernia was present. Diffuse mildly erythematous mucosa with no bleeding was found in the stomach. Biopsies taken. Biopsy sent for H. pylori. No gross lesions were noted in the entire duodenum. Random gastric biopsies returned fragments of unremarkablle gastric mucosa. Hyperplastic polypoid gastric mucosa, fundic type. H pylori negative. Distal esophagitis biopsies revealed focus of acute inflammation with evidence of mucosal erosion/ulcer. Strips of squamous epithelium with focal parakeratosis. Special stains for fungi were negative.   • History of herpes genitalis Approximately 2001    Approximately 2001--patient had an initial outbreak of genital herpes and has not had a recurrent since.   • History of pneumonia 2002     08/29/2002--bronchoscopy was performed with brushings and biopsies which were negative for malignant cells. Watching showed only normal respiratory breana. AFB and fungal smears negative. The thoracic surgeon wanted to do a VATS procedure and patient refused. His adenopathy as well as infiltrate subsequently resolved. No further recurrence.  08/09/2002--CT scan of the chest performed for abnormal c   • Impaired fasting glucose 8/20/2015 08/20/2015--serum glucose 109. Recommend diet, weight loss, exercise.   • Migraine headaches 2/23/2016   • Mild intermittent asthma 2/23/2016   • Multiple environmental allergies 2/23/2016    Patient had remote allergy testing as a child but never received immunotherapy except shots for poison ivy.   • Vitamin D deficiency 2/23/2016       Past Surgical History:   Procedure Laterality Date   • APPENDECTOMY  Childhood    As a child   • BRONCHOSCOPY  08/29/2002 08/29/2002--bronchoscopy was performed with brushings and biopsies which were negative for malignant cells. Watching showed only normal respiratory breana. AFB and fungal smears negative. The thoracic surgeon wanted to do a VATS procedure and patient refused. His adenopathy as well as infiltrate subsequently resolved. No further recurrence.  08/09/2002--CT scan of the chest performed for abnormal c   • COLONOSCOPY  02/27/2009 02/27/2009--colonoscopy reveals multiple large and small mouth diverticula in sigmoid colon. The rest of the colon was normal.   • DACRYOCYSTORHINOSTOMY Right 07/12/2012 07/12/2012--dacryocystorhinostomy of the right eye with silicone intubation of lacrimal drainage system. Patient's symptoms have totally resolved   • ESOPHAGOSCOPY / EGD  02/27/2009 02/27/2009--EGD revealed LA grade a esophagitis with no evidence of bleeding. Biopsies taken. A small hiatal hernia was present. Diffuse mildly erythematous mucosa with no bleeding was found in the stomach. Biopsies taken. Biopsy sent for H. pylori.  No gross lesions were noted in the entire duodenum. Random gastric biopsies returned fragments of unremarkablle gastric mucosa. Hyperplastic polypoid    • SKIN SURGERY  12/15/2008    12/15/2008--patient had a total of 7 lesions removed from his face and clavicle. Pathology returned seborrheic keratoses inflamed on the clavicle.   • TONSILLECTOMY  Childhood    As a child         Current Outpatient Medications:   •  albuterol (PROVENTIL HFA;VENTOLIN HFA) 108 (90 BASE) MCG/ACT inhaler, Proventil  (90 Base) MCG/ACT Inhalation Aerosol Solution; Patient Sig: Proventil  (90 Base) MCG/ACT Inhalation Aerosol Solution 1-2 puffs every 4 hours when necessary asthma; 1; 5; 23-Jan-2014; Active, Disp: , Rfl:   •  Cholecalciferol (VITAMIN D3) 5000 units capsule capsule, 1 by mouth daily as directed, Disp: 30 capsule, Rfl:   •  omeprazole (priLOSEC) 40 MG capsule, Take 1 capsule by mouth Daily., Disp: 90 capsule, Rfl: 1    No Known Allergies    Family History   Problem Relation Age of Onset   • Diabetes Father         Type 2       Social History     Socioeconomic History   • Marital status:      Spouse name: adan   • Number of children: Not on file   • Years of education: Not on file   • Highest education level: Master's degree (e.g., MA, MS, Janette, MEd, MSW, CAROL)   Occupational History   • Occupation: // for [employer]   Social Needs   • Financial resource strain: Not hard at all   • Food insecurity:     Worry: Never true     Inability: Never true   • Transportation needs:     Medical: No     Non-medical: No   Tobacco Use   • Smoking status: Never Smoker   • Smokeless tobacco: Never Used   Substance and Sexual Activity   • Alcohol use: No     Frequency: Never   • Drug use: No   • Sexual activity: Yes     Partners: Female   Lifestyle   • Physical activity:     Days per week: 3 days     Minutes per session: 20 min   • Stress: Only a little   Relationships   • Social  connections:     Talks on phone: Patient refused     Gets together: Patient refused     Attends Sabianist service: Patient refused     Active member of club or organization: Patient refused     Attends meetings of clubs or organizations: Patient refused     Relationship status: Patient refused       Review of Systems   All other systems reviewed and are negative.      Vitals:    12/05/19 0928   BP: 140/98   Temp: 98.7 °F (37.1 °C)       Physical Exam   Constitutional: He is oriented to person, place, and time. He appears well-developed and well-nourished. No distress.   HENT:   Head: Normocephalic and atraumatic. Hair is normal.   Right Ear: Hearing, tympanic membrane, external ear and ear canal normal.   Left Ear: Hearing, tympanic membrane, external ear and ear canal normal.   Nose: No sinus tenderness or nasal deformity.   Mouth/Throat: Uvula is midline, oropharynx is clear and moist and mucous membranes are normal. No oral lesions. No uvula swelling.   Eyes: Conjunctivae, EOM and lids are normal. Pupils are equal, round, and reactive to light. Right eye exhibits no discharge. Left eye exhibits no discharge. No scleral icterus. Right eye exhibits normal extraocular motion and no nystagmus. Left eye exhibits normal extraocular motion and no nystagmus.   Neck: Normal range of motion. Neck supple. No JVD present. No thyromegaly present.   Cardiovascular: Normal rate, regular rhythm, normal heart sounds, intact distal pulses and normal pulses. Exam reveals no gallop.   No murmur heard.  Pulmonary/Chest: Effort normal and breath sounds normal. No respiratory distress. He has no wheezes. He has no rales. He exhibits no tenderness.   Abdominal: Soft. Bowel sounds are normal. He exhibits no distension and no mass. There is no tenderness. There is no guarding. No hernia.   Musculoskeletal: Normal range of motion. He exhibits no edema, tenderness or deformity.   Lymphadenopathy:     He has no cervical adenopathy.    Neurological: He is alert and oriented to person, place, and time. He has normal reflexes. He displays normal reflexes. No cranial nerve deficit. He exhibits normal muscle tone. Coordination normal.   Skin: Skin is warm and dry. No rash noted. He is not diaphoretic.   Psychiatric: He has a normal mood and affect. His behavior is normal. Judgment and thought content normal.   Vitals reviewed.      Tito was seen today for heartburn, difficulty swallowing and colonoscopy.    Diagnoses and all orders for this visit:    Dyspepsia  -     Case Request; Standing  -     Case Request    Dysphagia, unspecified type  -     Case Request; Standing  -     Case Request    Encounter for screening for malignant neoplasm of colon  -     Case Request; Standing  -     Case Request    Other orders  -     Follow Anesthesia Guidelines / Standing Orders; Future  -     Obtain Informed Consent; Future  -     Implement Anesthesia orders day of procedure.; Standing  -     Obtain informed consent; Standing  -     Verify bowel prep was successful; Standing  -     Give tap water enema if bowel prep was insufficient; Standing      Assessment:  1. Dysphagia and dyspepsia  2. Needs a screening colonoscopy    Recommendations:  1. EGD and colonoscopy    Return He is to have an EGD and colonoscopy tomorrow. .    Marques Gonzalez MD  12/5/2019

## 2019-12-06 ENCOUNTER — ANESTHESIA EVENT (OUTPATIENT)
Dept: GASTROENTEROLOGY | Facility: HOSPITAL | Age: 59
End: 2019-12-06

## 2019-12-06 ENCOUNTER — HOSPITAL ENCOUNTER (OUTPATIENT)
Facility: HOSPITAL | Age: 59
Setting detail: HOSPITAL OUTPATIENT SURGERY
Discharge: HOME | End: 2019-12-06
Attending: INTERNAL MEDICINE | Admitting: INTERNAL MEDICINE

## 2019-12-06 ENCOUNTER — ANESTHESIA (OUTPATIENT)
Dept: GASTROENTEROLOGY | Facility: HOSPITAL | Age: 59
End: 2019-12-06

## 2019-12-06 VITALS
RESPIRATION RATE: 17 BRPM | DIASTOLIC BLOOD PRESSURE: 100 MMHG | OXYGEN SATURATION: 95 % | WEIGHT: 200.69 LBS | BODY MASS INDEX: 31.5 KG/M2 | HEART RATE: 79 BPM | HEIGHT: 67 IN | SYSTOLIC BLOOD PRESSURE: 151 MMHG | TEMPERATURE: 98.1 F

## 2019-12-06 DIAGNOSIS — Z12.11 SCREEN FOR COLON CANCER: ICD-10-CM

## 2019-12-06 DIAGNOSIS — R13.10 DYSPHAGIA: ICD-10-CM

## 2019-12-06 DIAGNOSIS — R11.2 NAUSEA & VOMITING: ICD-10-CM

## 2019-12-06 PROCEDURE — S0260 H&P FOR SURGERY: HCPCS | Performed by: INTERNAL MEDICINE

## 2019-12-06 PROCEDURE — 43239 EGD BIOPSY SINGLE/MULTIPLE: CPT | Performed by: INTERNAL MEDICINE

## 2019-12-06 PROCEDURE — 87081 CULTURE SCREEN ONLY: CPT | Performed by: INTERNAL MEDICINE

## 2019-12-06 PROCEDURE — 88313 SPECIAL STAINS GROUP 2: CPT | Performed by: INTERNAL MEDICINE

## 2019-12-06 PROCEDURE — 88305 TISSUE EXAM BY PATHOLOGIST: CPT | Performed by: INTERNAL MEDICINE

## 2019-12-06 PROCEDURE — C1726 CATH, BAL DIL, NON-VASCULAR: HCPCS | Performed by: INTERNAL MEDICINE

## 2019-12-06 PROCEDURE — 25010000002 PROPOFOL 10 MG/ML EMULSION: Performed by: ANESTHESIOLOGY

## 2019-12-06 PROCEDURE — 43249 ESOPH EGD DILATION <30 MM: CPT | Performed by: INTERNAL MEDICINE

## 2019-12-06 PROCEDURE — 45380 COLONOSCOPY AND BIOPSY: CPT | Performed by: INTERNAL MEDICINE

## 2019-12-06 RX ORDER — LIDOCAINE HYDROCHLORIDE 10 MG/ML
0.5 INJECTION, SOLUTION INFILTRATION; PERINEURAL ONCE AS NEEDED
Status: DISCONTINUED | OUTPATIENT
Start: 2019-12-06 | End: 2019-12-06 | Stop reason: HOSPADM

## 2019-12-06 RX ORDER — SODIUM CHLORIDE, SODIUM LACTATE, POTASSIUM CHLORIDE, CALCIUM CHLORIDE 600; 310; 30; 20 MG/100ML; MG/100ML; MG/100ML; MG/100ML
1000 INJECTION, SOLUTION INTRAVENOUS CONTINUOUS
Status: DISCONTINUED | OUTPATIENT
Start: 2019-12-06 | End: 2019-12-06 | Stop reason: HOSPADM

## 2019-12-06 RX ORDER — SODIUM CHLORIDE 0.9 % (FLUSH) 0.9 %
10 SYRINGE (ML) INJECTION AS NEEDED
Status: DISCONTINUED | OUTPATIENT
Start: 2019-12-06 | End: 2019-12-06 | Stop reason: HOSPADM

## 2019-12-06 RX ORDER — PROPOFOL 10 MG/ML
VIAL (ML) INTRAVENOUS AS NEEDED
Status: DISCONTINUED | OUTPATIENT
Start: 2019-12-06 | End: 2019-12-06 | Stop reason: SURG

## 2019-12-06 RX ORDER — LIDOCAINE HYDROCHLORIDE 20 MG/ML
INJECTION, SOLUTION INFILTRATION; PERINEURAL AS NEEDED
Status: DISCONTINUED | OUTPATIENT
Start: 2019-12-06 | End: 2019-12-06 | Stop reason: SURG

## 2019-12-06 RX ADMIN — LIDOCAINE HYDROCHLORIDE 100 MG: 20 INJECTION, SOLUTION INFILTRATION; PERINEURAL at 15:30

## 2019-12-06 RX ADMIN — SODIUM CHLORIDE, POTASSIUM CHLORIDE, SODIUM LACTATE AND CALCIUM CHLORIDE 1000 ML: 600; 310; 30; 20 INJECTION, SOLUTION INTRAVENOUS at 13:59

## 2019-12-06 RX ADMIN — PROPOFOL 800 MG: 10 INJECTION, EMULSION INTRAVENOUS at 15:30

## 2019-12-06 NOTE — H&P
Chief Complaint   Patient presents with   • Heartburn   • Difficulty Swallowing   • Colonoscopy         History of Present Illness:   59 y.o. male who has solids dysphagia for years. Sometimes heartburn. He doesn't take Omeprazole because of fear of dementia and other side effects. No nausea or vomiting. No chest or abdominal pain. No diarrhea or constipation. NO rectal bleeding or melena. Weight stable. Nonsmoker, no ETOH. Wife is pediatrician.     Medical History        Past Medical History:   Diagnosis Date   • Allergic rhinitis 2/23/2016     Patient had remote allergy testing as a child but never received immunotherapy except shots for poison ivy.   • Diverticulosis of colon 2/27/2009 02/27/2009--colonoscopy reveals multiple large and small mouth diverticula in sigmoid colon. The rest of the colon was normal.   • Dyslipidemia 8/20/2015 08/20/2015--NMR reveals a total cholesterol 140. Triglycerides elevated at 203. LDL particle number excellent at 965. Small LDL particle number elevated at 747. HDL particle number low at 26.0.   • Gastroesophageal reflux disease with esophagitis 2/27/2009 02/27/2009--EGD revealed LA grade a esophagitis with no evidence of bleeding. Biopsies taken. A small hiatal hernia was present. Diffuse mildly erythematous mucosa with no bleeding was found in the stomach. Biopsies taken. Biopsy sent for H. pylori. No gross lesions were noted in the entire duodenum. Random gastric biopsies returned fragments of unremarkablle gastric mucosa. Hyperplastic polypoid gastric mucosa, fundic type. H pylori negative. Distal esophagitis biopsies revealed focus of acute inflammation with evidence of mucosal erosion/ulcer. Strips of squamous epithelium with focal parakeratosis. Special stains for fungi were negative.   • History of herpes genitalis Approximately 2001     Approximately 2001--patient had an initial outbreak of genital herpes and has not had a recurrent since.   • History of  pneumonia 2002 08/29/2002--bronchoscopy was performed with brushings and biopsies which were negative for malignant cells. Watching showed only normal respiratory breana. AFB and fungal smears negative. The thoracic surgeon wanted to do a VATS procedure and patient refused. His adenopathy as well as infiltrate subsequently resolved. No further recurrence.  08/09/2002--CT scan of the chest performed for abnormal c   • Impaired fasting glucose 8/20/2015 08/20/2015--serum glucose 109. Recommend diet, weight loss, exercise.   • Migraine headaches 2/23/2016   • Mild intermittent asthma 2/23/2016   • Multiple environmental allergies 2/23/2016     Patient had remote allergy testing as a child but never received immunotherapy except shots for poison ivy.   • Vitamin D deficiency 2/23/2016            Surgical History         Past Surgical History:   Procedure Laterality Date   • APPENDECTOMY   Childhood     As a child   • BRONCHOSCOPY   08/29/2002 08/29/2002--bronchoscopy was performed with brushings and biopsies which were negative for malignant cells. Watching showed only normal respiratory breana. AFB and fungal smears negative. The thoracic surgeon wanted to do a VATS procedure and patient refused. His adenopathy as well as infiltrate subsequently resolved. No further recurrence.  08/09/2002--CT scan of the chest performed for abnormal c   • COLONOSCOPY   02/27/2009 02/27/2009--colonoscopy reveals multiple large and small mouth diverticula in sigmoid colon. The rest of the colon was normal.   • DACRYOCYSTORHINOSTOMY Right 07/12/2012 07/12/2012--dacryocystorhinostomy of the right eye with silicone intubation of lacrimal drainage system. Patient's symptoms have totally resolved   • ESOPHAGOSCOPY / EGD   02/27/2009 02/27/2009--EGD revealed LA grade a esophagitis with no evidence of bleeding. Biopsies taken. A small hiatal hernia was present. Diffuse mildly erythematous mucosa with no bleeding was found  in the stomach. Biopsies taken. Biopsy sent for H. pylori. No gross lesions were noted in the entire duodenum. Random gastric biopsies returned fragments of unremarkablle gastric mucosa. Hyperplastic polypoid    • SKIN SURGERY   12/15/2008     12/15/2008--patient had a total of 7 lesions removed from his face and clavicle. Pathology returned seborrheic keratoses inflamed on the clavicle.   • TONSILLECTOMY   Childhood     As a child               Current Outpatient Medications:   •  albuterol (PROVENTIL HFA;VENTOLIN HFA) 108 (90 BASE) MCG/ACT inhaler, Proventil  (90 Base) MCG/ACT Inhalation Aerosol Solution; Patient Sig: Proventil  (90 Base) MCG/ACT Inhalation Aerosol Solution 1-2 puffs every 4 hours when necessary asthma; 1; 5; 23-Jan-2014; Active, Disp: , Rfl:   •  Cholecalciferol (VITAMIN D3) 5000 units capsule capsule, 1 by mouth daily as directed, Disp: 30 capsule, Rfl:   •  omeprazole (priLOSEC) 40 MG capsule, Take 1 capsule by mouth Daily., Disp: 90 capsule, Rfl: 1     No Known Allergies           Family History   Problem Relation Age of Onset   • Diabetes Father           Type 2         Social History   Social History            Socioeconomic History   • Marital status:        Spouse name: adan   • Number of children: Not on file   • Years of education: Not on file   • Highest education level: Master's degree (e.g., MA, MS, Janette, MEd, MSW, CAROL)   Occupational History   • Occupation: // for [employer]   Social Needs   • Financial resource strain: Not hard at all   • Food insecurity:       Worry: Never true       Inability: Never true   • Transportation needs:       Medical: No       Non-medical: No   Tobacco Use   • Smoking status: Never Smoker   • Smokeless tobacco: Never Used   Substance and Sexual Activity   • Alcohol use: No       Frequency: Never   • Drug use: No   • Sexual activity: Yes       Partners: Female   Lifestyle   • Physical activity:        Days per week: 3 days       Minutes per session: 20 min   • Stress: Only a little   Relationships   • Social connections:       Talks on phone: Patient refused       Gets together: Patient refused       Attends Christianity service: Patient refused       Active member of club or organization: Patient refused       Attends meetings of clubs or organizations: Patient refused       Relationship status: Patient refused            Review of Systems   All other systems reviewed and are negative.            Vitals:     12/05/19 0928   BP: 140/98   Temp: 98.7 °F (37.1 °C)         Physical Exam   Constitutional: He is oriented to person, place, and time. He appears well-developed and well-nourished. No distress.   HENT:   Head: Normocephalic and atraumatic. Hair is normal.   Right Ear: Hearing, tympanic membrane, external ear and ear canal normal.   Left Ear: Hearing, tympanic membrane, external ear and ear canal normal.   Nose: No sinus tenderness or nasal deformity.   Mouth/Throat: Uvula is midline, oropharynx is clear and moist and mucous membranes are normal. No oral lesions. No uvula swelling.   Eyes: Conjunctivae, EOM and lids are normal. Pupils are equal, round, and reactive to light. Right eye exhibits no discharge. Left eye exhibits no discharge. No scleral icterus. Right eye exhibits normal extraocular motion and no nystagmus. Left eye exhibits normal extraocular motion and no nystagmus.   Neck: Normal range of motion. Neck supple. No JVD present. No thyromegaly present.   Cardiovascular: Normal rate, regular rhythm, normal heart sounds, intact distal pulses and normal pulses. Exam reveals no gallop.   No murmur heard.  Pulmonary/Chest: Effort normal and breath sounds normal. No respiratory distress. He has no wheezes. He has no rales. He exhibits no tenderness.   Abdominal: Soft. Bowel sounds are normal. He exhibits no distension and no mass. There is no tenderness. There is no guarding. No hernia.    Musculoskeletal: Normal range of motion. He exhibits no edema, tenderness or deformity.   Lymphadenopathy:     He has no cervical adenopathy.   Neurological: He is alert and oriented to person, place, and time. He has normal reflexes. He displays normal reflexes. No cranial nerve deficit. He exhibits normal muscle tone. Coordination normal.   Skin: Skin is warm and dry. No rash noted. He is not diaphoretic.   Psychiatric: He has a normal mood and affect. His behavior is normal. Judgment and thought content normal.   Vitals reviewed.        Tito was seen today for heartburn, difficulty swallowing and colonoscopy.     Diagnoses and all orders for this visit:     Dyspepsia  -     Case Request; Standing  -     Case Request     Dysphagia, unspecified type  -     Case Request; Standing  -     Case Request     Encounter for screening for malignant neoplasm of colon  -     Case Request; Standing  -     Case Request     Other orders  -     Follow Anesthesia Guidelines / Standing Orders; Future  -     Obtain Informed Consent; Future  -     Implement Anesthesia orders day of procedure.; Standing  -     Obtain informed consent; Standing  -     Verify bowel prep was successful; Standing  -     Give tap water enema if bowel prep was insufficient; Standing        Assessment:  1. Dysphagia and dyspepsia  2. Needs a screening colonoscopy     Recommendations:  1. EGD and colonoscopy     Return He is to have an EGD and colonoscopy tomorrow. .     12/6/19 - No change from the above H and P.   Marques Gonzalez MD

## 2019-12-06 NOTE — ANESTHESIA POSTPROCEDURE EVALUATION
"Patient: Tito Jackson    Procedure Summary     Date:  12/06/19 Room / Location:  [facility] ENDOSCOPY 6 /  [facility]    Anesthesia Start:  1526 Anesthesia Stop:  1627    Procedures:       ESOPHAGOGASTRODUODENOSCOPY with biopsies and dilation of esophageal stricture using 15-18 balloon (N/A Esophagus)      COLONOSCOPY into cecum and TI with cold biopsy polypectomies (N/A ) Diagnosis:       Nausea & vomiting      Dysphagia      Screen for colon cancer      (Nausea & vomiting [R11.2])      (Dysphagia [R13.10])      (Screen for colon cancer [Z12.11])    Surgeon:  Marques Gonzalez MD Provider:  Jarad Fontenot MD    Anesthesia Type:  MAC ASA Status:  2          Anesthesia Type: MAC  Last vitals  BP   99/63 (12/06/19 1627)   Temp   36.7 °C (98.1 °F) (12/06/19 1344)   Pulse   77 (12/06/19 1627)   Resp   17 (12/06/19 1344)     SpO2   93 % (12/06/19 1627)     Post Anesthesia Care and Evaluation    Patient location during evaluation: bedside  Patient participation: complete - patient participated  Level of consciousness: awake and alert  Pain management: adequate  Airway patency: patent  Anesthetic complications: No anesthetic complications    Cardiovascular status: acceptable  Respiratory status: acceptable  Hydration status: acceptable    Comments: BP 99/63 (BP Location: Left arm, Patient Position: Lying)   Pulse 77   Temp 36.7 °C (98.1 °F) (Oral)   Resp 17   Ht 170.2 cm (67\")   Wt 91 kg (200 lb 11 oz)   SpO2 93%   BMI 31.43 kg/m²       "

## 2019-12-06 NOTE — ANESTHESIA PREPROCEDURE EVALUATION
Anesthesia Evaluation     Patient summary reviewed and Nursing notes reviewed   NPO Solid Status: > 8 hours  NPO Liquid Status: > 4 hours           Airway   Mallampati: II  TM distance: >3 FB  Neck ROM: full  no difficulty expected  Dental - normal exam     Pulmonary - normal exam   (+) asthma,sleep apnea,   Cardiovascular - normal exam        Neuro/Psych  (+) headaches,     GI/Hepatic/Renal/Endo    (+) obesity,       Musculoskeletal     Abdominal  - normal exam   Substance History      OB/GYN          Other                        Anesthesia Plan    ASA 2     MAC     intravenous induction     Anesthetic plan, all risks, benefits, and alternatives have been provided, discussed and informed consent has been obtained with: patient.

## 2019-12-08 LAB — UREASE TISS QL: NEGATIVE

## 2019-12-10 LAB
CYTO UR: NORMAL
LAB AP CASE REPORT: NORMAL
LAB AP DIAGNOSIS COMMENT: NORMAL
PATH REPORT.ADDENDUM SPEC: NORMAL
PATH REPORT.FINAL DX SPEC: NORMAL
PATH REPORT.GROSS SPEC: NORMAL

## 2019-12-18 ENCOUNTER — TELEPHONE (OUTPATIENT)
Dept: GASTROENTEROLOGY | Facility: CLINIC | Age: 59
End: 2019-12-18

## 2019-12-18 NOTE — TELEPHONE ENCOUNTER
----- Message from Marques Gonzalez MD sent at 12/18/2019  8:02 AM EST -----  12/18/19  Tell him that pathology from the EGD showed some inflammation in the stomach but no evidence of Helicobacter pylori which is good.  The colon polyps that were removed were not cancerous but some of them were precancerous.  I would recommend that he have a repeat colonoscopy in 3 years.  Please fax a copy of this report to his PCP.  Kodak miramontes        --Called pt and advised of the above.  Pt verb understanding.   C/s placed in recall for 12/06/2022 and path results sent to Dr Butcher thru [facility].   Kary Guajardo RN

## 2019-12-18 NOTE — PROGRESS NOTES
12/18/19  Tell him that pathology from the EGD showed some inflammation in the stomach but no evidence of Helicobacter pylori which is good.  The colon polyps that were removed were not cancerous but some of them were precancerous.  I would recommend that he have a repeat colonoscopy in 3 years.  Please fax a copy of this report to his PCP.  Jaime. kjh

## 2020-04-15 ENCOUNTER — RESULTS ENCOUNTER (OUTPATIENT)
Dept: INTERNAL MEDICINE | Facility: CLINIC | Age: 60
End: 2020-04-15

## 2020-04-15 DIAGNOSIS — E78.5 DYSLIPIDEMIA: Chronic | ICD-10-CM

## 2020-04-15 DIAGNOSIS — E55.9 VITAMIN D DEFICIENCY: Chronic | ICD-10-CM

## 2020-04-15 DIAGNOSIS — Z00.00 ROUTINE PHYSICAL EXAMINATION: ICD-10-CM

## 2020-04-15 DIAGNOSIS — R73.01 IMPAIRED FASTING GLUCOSE: Chronic | ICD-10-CM

## 2020-04-16 ENCOUNTER — RESULTS ENCOUNTER (OUTPATIENT)
Dept: INTERNAL MEDICINE | Facility: CLINIC | Age: 60
End: 2020-04-16

## 2020-04-16 DIAGNOSIS — Z00.00 ROUTINE PHYSICAL EXAMINATION: ICD-10-CM

## 2021-09-07 ENCOUNTER — HOSPITAL ENCOUNTER (OUTPATIENT)
Dept: CARDIOLOGY | Facility: HOSPITAL | Age: 61
Discharge: HOME | End: 2021-09-07

## 2021-09-07 ENCOUNTER — LAB (OUTPATIENT)
Dept: LAB | Facility: HOSPITAL | Age: 61
End: 2021-09-07

## 2021-09-07 ENCOUNTER — HOSPITAL ENCOUNTER (OUTPATIENT)
Dept: GENERAL RADIOLOGY | Facility: HOSPITAL | Age: 61
Discharge: HOME | End: 2021-09-07

## 2021-09-07 ENCOUNTER — TRANSCRIBE ORDERS (OUTPATIENT)
Dept: ADMINISTRATIVE | Facility: HOSPITAL | Age: 61
End: 2021-09-07

## 2021-09-07 DIAGNOSIS — Z01.818 PRE-OP TESTING: ICD-10-CM

## 2021-09-07 DIAGNOSIS — Z01.818 PRE-OP TESTING: Primary | ICD-10-CM

## 2021-09-07 DIAGNOSIS — Z01.811 PRE-OP CHEST EXAM: ICD-10-CM

## 2021-09-07 LAB
ABO GROUP BLD: NORMAL
ANION GAP SERPL CALCULATED.3IONS-SCNC: 13.9 MMOL/L (ref 5–15)
BLD GP AB SCN SERPL QL: NEGATIVE
BUN SERPL-MCNC: 20 MG/DL (ref 8–23)
BUN/CREAT SERPL: 14.8 (ref 7–25)
CALCIUM SPEC-SCNC: 9.8 MG/DL (ref 8.6–10.5)
CHLORIDE SERPL-SCNC: 103 MMOL/L (ref 98–107)
CO2 SERPL-SCNC: 24.1 MMOL/L (ref 22–29)
CREAT SERPL-MCNC: 1.35 MG/DL (ref 0.76–1.27)
DEPRECATED RDW RBC AUTO: 45.3 FL (ref 37–54)
ERYTHROCYTE [DISTWIDTH] IN BLOOD BY AUTOMATED COUNT: 14.1 % (ref 12.3–15.4)
GFR SERPL CREATININE-BSD FRML MDRD: 53 ML/MIN/1.73
GLUCOSE SERPL-MCNC: 96 MG/DL (ref 65–99)
HCT VFR BLD AUTO: 40.9 % (ref 37.5–51)
HGB BLD-MCNC: 13.7 G/DL (ref 13–17.7)
MCH RBC QN AUTO: 29.8 PG (ref 26.6–33)
MCHC RBC AUTO-ENTMCNC: 33.5 G/DL (ref 31.5–35.7)
MCV RBC AUTO: 88.9 FL (ref 79–97)
PLATELET # BLD AUTO: 225 10*3/MM3 (ref 140–450)
PMV BLD AUTO: 11.2 FL (ref 6–12)
POTASSIUM SERPL-SCNC: 4.6 MMOL/L (ref 3.5–5.2)
QT INTERVAL: 370 MS
RBC # BLD AUTO: 4.6 10*6/MM3 (ref 4.14–5.8)
RH BLD: POSITIVE
SODIUM SERPL-SCNC: 141 MMOL/L (ref 136–145)
T&S EXPIRATION DATE: NORMAL
WBC # BLD AUTO: 6.61 10*3/MM3 (ref 3.4–10.8)

## 2021-09-07 PROCEDURE — 86900 BLOOD TYPING SEROLOGIC ABO: CPT

## 2021-09-07 PROCEDURE — 71046 X-RAY EXAM CHEST 2 VIEWS: CPT

## 2021-09-07 PROCEDURE — 80048 BASIC METABOLIC PNL TOTAL CA: CPT

## 2021-09-07 PROCEDURE — 36415 COLL VENOUS BLD VENIPUNCTURE: CPT

## 2021-09-07 PROCEDURE — 86850 RBC ANTIBODY SCREEN: CPT

## 2021-09-07 PROCEDURE — 93010 ELECTROCARDIOGRAM REPORT: CPT | Performed by: INTERNAL MEDICINE

## 2021-09-07 PROCEDURE — 85027 COMPLETE CBC AUTOMATED: CPT

## 2021-09-07 PROCEDURE — 86901 BLOOD TYPING SEROLOGIC RH(D): CPT

## 2021-09-07 PROCEDURE — 93005 ELECTROCARDIOGRAM TRACING: CPT | Performed by: UROLOGY

## 2021-09-08 ENCOUNTER — TRANSCRIBE ORDERS (OUTPATIENT)
Dept: ADMINISTRATIVE | Facility: HOSPITAL | Age: 61
End: 2021-09-08

## 2021-09-08 ENCOUNTER — LAB (OUTPATIENT)
Dept: LAB | Facility: HOSPITAL | Age: 61
End: 2021-09-08

## 2021-09-08 DIAGNOSIS — Z01.818 OTHER SPECIFIED PRE-OPERATIVE EXAMINATION: Primary | ICD-10-CM

## 2021-09-08 LAB — SARS-COV-2 ORF1AB RESP QL NAA+PROBE: NOT DETECTED

## 2021-09-08 PROCEDURE — U0004 COV-19 TEST NON-CDC HGH THRU: HCPCS | Performed by: INTERNAL MEDICINE

## 2021-09-08 PROCEDURE — C9803 HOPD COVID-19 SPEC COLLECT: HCPCS | Performed by: INTERNAL MEDICINE

## 2021-09-08 RX ORDER — OLMESARTAN MEDOXOMIL AND HYDROCHLOROTHIAZIDE 40/25 40; 25 MG/1; MG/1
1 TABLET ORAL DAILY
COMMUNITY
Start: 2021-07-03 | End: 2022-05-09

## 2021-09-08 RX ORDER — LISINOPRIL 40 MG/1
40 TABLET ORAL DAILY
COMMUNITY
Start: 2021-06-21 | End: 2022-05-09

## 2021-09-08 RX ORDER — HYDROCODONE BITARTRATE AND ACETAMINOPHEN 5; 325 MG/1; MG/1
1 TABLET ORAL EVERY 6 HOURS PRN
COMMUNITY
Start: 2021-07-30 | End: 2022-05-09

## 2021-09-09 ENCOUNTER — ANESTHESIA EVENT (OUTPATIENT)
Dept: PERIOP | Facility: HOSPITAL | Age: 61
End: 2021-09-09

## 2021-09-09 ENCOUNTER — HOSPITAL ENCOUNTER (OUTPATIENT)
Facility: HOSPITAL | Age: 61
Discharge: HOME | End: 2021-09-11
Attending: UROLOGY | Admitting: UROLOGY

## 2021-09-09 ENCOUNTER — ANESTHESIA (OUTPATIENT)
Dept: PERIOP | Facility: HOSPITAL | Age: 61
End: 2021-09-09

## 2021-09-09 DIAGNOSIS — N28.89 LEFT RENAL MASS: Primary | ICD-10-CM

## 2021-09-09 DIAGNOSIS — N28.89 LEFT KIDNEY MASS: ICD-10-CM

## 2021-09-09 LAB
ANION GAP SERPL CALCULATED.3IONS-SCNC: 12.6 MMOL/L (ref 5–15)
BUN SERPL-MCNC: 25 MG/DL (ref 8–23)
BUN/CREAT SERPL: 14.9 (ref 7–25)
CALCIUM SPEC-SCNC: 8.5 MG/DL (ref 8.6–10.5)
CHLORIDE SERPL-SCNC: 104 MMOL/L (ref 98–107)
CO2 SERPL-SCNC: 19.4 MMOL/L (ref 22–29)
CREAT SERPL-MCNC: 1.68 MG/DL (ref 0.76–1.27)
DEPRECATED RDW RBC AUTO: 47.6 FL (ref 37–54)
ERYTHROCYTE [DISTWIDTH] IN BLOOD BY AUTOMATED COUNT: 13.9 % (ref 12.3–15.4)
GFR SERPL CREATININE-BSD FRML MDRD: 41 ML/MIN/1.73
GLUCOSE BLDC GLUCOMTR-MCNC: 142 MG/DL (ref 70–130)
GLUCOSE SERPL-MCNC: 147 MG/DL (ref 65–99)
HCT VFR BLD AUTO: 38.7 % (ref 37.5–51)
HGB BLD-MCNC: 12.6 G/DL (ref 13–17.7)
MCH RBC QN AUTO: 30.1 PG (ref 26.6–33)
MCHC RBC AUTO-ENTMCNC: 32.6 G/DL (ref 31.5–35.7)
MCV RBC AUTO: 92.6 FL (ref 79–97)
PLATELET # BLD AUTO: 214 10*3/MM3 (ref 140–450)
PMV BLD AUTO: 11.3 FL (ref 6–12)
POTASSIUM SERPL-SCNC: 4 MMOL/L (ref 3.5–5.2)
RBC # BLD AUTO: 4.18 10*6/MM3 (ref 4.14–5.8)
SODIUM SERPL-SCNC: 136 MMOL/L (ref 136–145)
WBC # BLD AUTO: 17.43 10*3/MM3 (ref 3.4–10.8)

## 2021-09-09 PROCEDURE — 25010000002 FENTANYL CITRATE (PF) 50 MCG/ML SOLUTION: Performed by: NURSE ANESTHETIST, CERTIFIED REGISTERED

## 2021-09-09 PROCEDURE — 88360 TUMOR IMMUNOHISTOCHEM/MANUAL: CPT | Performed by: UROLOGY

## 2021-09-09 PROCEDURE — 25010000002 NEOSTIGMINE 5 MG/10ML SOLUTION: Performed by: NURSE ANESTHETIST, CERTIFIED REGISTERED

## 2021-09-09 PROCEDURE — 25010000002 ONDANSETRON PER 1 MG: Performed by: NURSE ANESTHETIST, CERTIFIED REGISTERED

## 2021-09-09 PROCEDURE — C1889 IMPLANT/INSERT DEVICE, NOC: HCPCS | Performed by: UROLOGY

## 2021-09-09 PROCEDURE — 88307 TISSUE EXAM BY PATHOLOGIST: CPT | Performed by: UROLOGY

## 2021-09-09 PROCEDURE — 25010000002 HYDRALAZINE PER 20 MG: Performed by: NURSE ANESTHETIST, CERTIFIED REGISTERED

## 2021-09-09 PROCEDURE — 25010000002 PROPOFOL 10 MG/ML EMULSION: Performed by: ANESTHESIOLOGY

## 2021-09-09 PROCEDURE — 85027 COMPLETE CBC AUTOMATED: CPT | Performed by: UROLOGY

## 2021-09-09 PROCEDURE — G0378 HOSPITAL OBSERVATION PER HR: HCPCS

## 2021-09-09 PROCEDURE — 88332 PATH CONSLTJ SURG EA ADD BLK: CPT | Performed by: UROLOGY

## 2021-09-09 PROCEDURE — 25010000003 CEFAZOLIN IN DEXTROSE 2-4 GM/100ML-% SOLUTION: Performed by: UROLOGY

## 2021-09-09 PROCEDURE — 25010000002 HYDROMORPHONE PER 4 MG: Performed by: NURSE ANESTHETIST, CERTIFIED REGISTERED

## 2021-09-09 PROCEDURE — 80048 BASIC METABOLIC PNL TOTAL CA: CPT | Performed by: UROLOGY

## 2021-09-09 PROCEDURE — 25010000002 FENTANYL CITRATE (PF) 50 MCG/ML SOLUTION: Performed by: ANESTHESIOLOGY

## 2021-09-09 PROCEDURE — 88331 PATH CONSLTJ SURG 1 BLK 1SPC: CPT | Performed by: UROLOGY

## 2021-09-09 PROCEDURE — 88305 TISSUE EXAM BY PATHOLOGIST: CPT | Performed by: UROLOGY

## 2021-09-09 PROCEDURE — 25010000002 PHENYLEPHRINE PER 1 ML: Performed by: NURSE ANESTHETIST, CERTIFIED REGISTERED

## 2021-09-09 PROCEDURE — 25010000002 DEXAMETHASONE PER 1 MG: Performed by: ANESTHESIOLOGY

## 2021-09-09 PROCEDURE — 94799 UNLISTED PULMONARY SVC/PX: CPT

## 2021-09-09 PROCEDURE — 25010000003 LIDOCAINE 1 % SOLUTION: Performed by: UROLOGY

## 2021-09-09 PROCEDURE — 25010000003 CEFAZOLIN IN DEXTROSE 2-4 GM/100ML-% SOLUTION: Performed by: ANESTHESIOLOGY

## 2021-09-09 PROCEDURE — 88342 IMHCHEM/IMCYTCHM 1ST ANTB: CPT | Performed by: UROLOGY

## 2021-09-09 PROCEDURE — 82962 GLUCOSE BLOOD TEST: CPT

## 2021-09-09 DEVICE — FLOSEAL HEMOSTATIC MATRIX, 10ML
Type: IMPLANTABLE DEVICE | Site: ABDOMEN | Status: FUNCTIONAL
Brand: FLOSEAL HEMOSTATIC MATRIX

## 2021-09-09 DEVICE — HORIZON TI ML 6 CLIPS/CART
Type: IMPLANTABLE DEVICE | Site: ABDOMEN | Status: FUNCTIONAL
Brand: WECK

## 2021-09-09 DEVICE — HEMOLOK L 6 CLIPS/CART
Type: IMPLANTABLE DEVICE | Site: ABDOMEN | Status: FUNCTIONAL
Brand: WECK

## 2021-09-09 DEVICE — KNOTLESS TISSUE CONTROL DEVICE, UNDYED UNIDIRECTIONAL (ANTIBACTERIAL) SYNTHETIC ABSORBABLE DEVICE
Type: IMPLANTABLE DEVICE | Site: ABDOMEN | Status: FUNCTIONAL
Brand: STRATAFIX

## 2021-09-09 RX ORDER — HYDROMORPHONE HYDROCHLORIDE 1 MG/ML
0.5 INJECTION, SOLUTION INTRAMUSCULAR; INTRAVENOUS; SUBCUTANEOUS
Status: DISCONTINUED | OUTPATIENT
Start: 2021-09-09 | End: 2021-09-11 | Stop reason: HOSPADM

## 2021-09-09 RX ORDER — HYDROCODONE BITARTRATE AND ACETAMINOPHEN 7.5; 325 MG/1; MG/1
1 TABLET ORAL ONCE AS NEEDED
Status: DISCONTINUED | OUTPATIENT
Start: 2021-09-09 | End: 2021-09-09 | Stop reason: HOSPADM

## 2021-09-09 RX ORDER — GLYCOPYRROLATE 0.2 MG/ML
INJECTION INTRAMUSCULAR; INTRAVENOUS AS NEEDED
Status: DISCONTINUED | OUTPATIENT
Start: 2021-09-09 | End: 2021-09-09 | Stop reason: SURG

## 2021-09-09 RX ORDER — SODIUM CHLORIDE 9 MG/ML
150 INJECTION, SOLUTION INTRAVENOUS CONTINUOUS
Status: DISCONTINUED | OUTPATIENT
Start: 2021-09-09 | End: 2021-09-11

## 2021-09-09 RX ORDER — ONDANSETRON 2 MG/ML
INJECTION INTRAMUSCULAR; INTRAVENOUS AS NEEDED
Status: DISCONTINUED | OUTPATIENT
Start: 2021-09-09 | End: 2021-09-09 | Stop reason: SURG

## 2021-09-09 RX ORDER — LABETALOL HYDROCHLORIDE 5 MG/ML
5 INJECTION, SOLUTION INTRAVENOUS
Status: DISCONTINUED | OUTPATIENT
Start: 2021-09-09 | End: 2021-09-09 | Stop reason: HOSPADM

## 2021-09-09 RX ORDER — MAGNESIUM HYDROXIDE 1200 MG/15ML
LIQUID ORAL AS NEEDED
Status: DISCONTINUED | OUTPATIENT
Start: 2021-09-09 | End: 2021-09-09 | Stop reason: HOSPADM

## 2021-09-09 RX ORDER — ALBUTEROL SULFATE 2.5 MG/3ML
2.5 SOLUTION RESPIRATORY (INHALATION)
Status: DISCONTINUED | OUTPATIENT
Start: 2021-09-09 | End: 2021-09-11 | Stop reason: HOSPADM

## 2021-09-09 RX ORDER — EPHEDRINE SULFATE 50 MG/ML
5 INJECTION, SOLUTION INTRAVENOUS ONCE AS NEEDED
Status: DISCONTINUED | OUTPATIENT
Start: 2021-09-09 | End: 2021-09-09 | Stop reason: HOSPADM

## 2021-09-09 RX ORDER — NALOXONE HCL 0.4 MG/ML
0.2 VIAL (ML) INJECTION AS NEEDED
Status: DISCONTINUED | OUTPATIENT
Start: 2021-09-09 | End: 2021-09-09 | Stop reason: HOSPADM

## 2021-09-09 RX ORDER — PROMETHAZINE HYDROCHLORIDE 25 MG/1
25 TABLET ORAL ONCE AS NEEDED
Status: DISCONTINUED | OUTPATIENT
Start: 2021-09-09 | End: 2021-09-09 | Stop reason: HOSPADM

## 2021-09-09 RX ORDER — ONDANSETRON 4 MG/1
4 TABLET, FILM COATED ORAL EVERY 6 HOURS PRN
Status: DISCONTINUED | OUTPATIENT
Start: 2021-09-09 | End: 2021-09-11 | Stop reason: HOSPADM

## 2021-09-09 RX ORDER — MIDAZOLAM HYDROCHLORIDE 1 MG/ML
1 INJECTION INTRAMUSCULAR; INTRAVENOUS
Status: DISCONTINUED | OUTPATIENT
Start: 2021-09-09 | End: 2021-09-09 | Stop reason: HOSPADM

## 2021-09-09 RX ORDER — IBUPROFEN 600 MG/1
600 TABLET ORAL ONCE AS NEEDED
Status: DISCONTINUED | OUTPATIENT
Start: 2021-09-09 | End: 2021-09-09 | Stop reason: HOSPADM

## 2021-09-09 RX ORDER — SODIUM CHLORIDE, SODIUM LACTATE, POTASSIUM CHLORIDE, CALCIUM CHLORIDE 600; 310; 30; 20 MG/100ML; MG/100ML; MG/100ML; MG/100ML
9 INJECTION, SOLUTION INTRAVENOUS CONTINUOUS
Status: DISCONTINUED | OUTPATIENT
Start: 2021-09-09 | End: 2021-09-09 | Stop reason: HOSPADM

## 2021-09-09 RX ORDER — HYDROMORPHONE HCL 110MG/55ML
PATIENT CONTROLLED ANALGESIA SYRINGE INTRAVENOUS AS NEEDED
Status: DISCONTINUED | OUTPATIENT
Start: 2021-09-09 | End: 2021-09-09 | Stop reason: SURG

## 2021-09-09 RX ORDER — CEFAZOLIN SODIUM 2 G/100ML
2 INJECTION, SOLUTION INTRAVENOUS ONCE
Status: COMPLETED | OUTPATIENT
Start: 2021-09-09 | End: 2021-09-10

## 2021-09-09 RX ORDER — PROMETHAZINE HYDROCHLORIDE 25 MG/1
25 SUPPOSITORY RECTAL ONCE AS NEEDED
Status: DISCONTINUED | OUTPATIENT
Start: 2021-09-09 | End: 2021-09-09 | Stop reason: HOSPADM

## 2021-09-09 RX ORDER — DIPHENHYDRAMINE HCL 25 MG
25 CAPSULE ORAL
Status: DISCONTINUED | OUTPATIENT
Start: 2021-09-09 | End: 2021-09-09 | Stop reason: HOSPADM

## 2021-09-09 RX ORDER — FLUMAZENIL 0.1 MG/ML
0.2 INJECTION INTRAVENOUS AS NEEDED
Status: DISCONTINUED | OUTPATIENT
Start: 2021-09-09 | End: 2021-09-09 | Stop reason: HOSPADM

## 2021-09-09 RX ORDER — PROPOFOL 10 MG/ML
VIAL (ML) INTRAVENOUS AS NEEDED
Status: DISCONTINUED | OUTPATIENT
Start: 2021-09-09 | End: 2021-09-09 | Stop reason: SURG

## 2021-09-09 RX ORDER — FENTANYL CITRATE 50 UG/ML
50 INJECTION, SOLUTION INTRAMUSCULAR; INTRAVENOUS
Status: DISCONTINUED | OUTPATIENT
Start: 2021-09-09 | End: 2021-09-09 | Stop reason: HOSPADM

## 2021-09-09 RX ORDER — LIDOCAINE HYDROCHLORIDE 10 MG/ML
INJECTION, SOLUTION INFILTRATION; PERINEURAL AS NEEDED
Status: DISCONTINUED | OUTPATIENT
Start: 2021-09-09 | End: 2021-09-09 | Stop reason: HOSPADM

## 2021-09-09 RX ORDER — SODIUM CHLORIDE 0.9 % (FLUSH) 0.9 %
3 SYRINGE (ML) INJECTION EVERY 12 HOURS SCHEDULED
Status: DISCONTINUED | OUTPATIENT
Start: 2021-09-09 | End: 2021-09-09 | Stop reason: HOSPADM

## 2021-09-09 RX ORDER — NALOXONE HCL 0.4 MG/ML
0.1 VIAL (ML) INJECTION
Status: DISCONTINUED | OUTPATIENT
Start: 2021-09-09 | End: 2021-09-11 | Stop reason: HOSPADM

## 2021-09-09 RX ORDER — KETAMINE HYDROCHLORIDE 10 MG/ML
INJECTION INTRAMUSCULAR; INTRAVENOUS AS NEEDED
Status: DISCONTINUED | OUTPATIENT
Start: 2021-09-09 | End: 2021-09-09 | Stop reason: SURG

## 2021-09-09 RX ORDER — OXYCODONE HYDROCHLORIDE AND ACETAMINOPHEN 5; 325 MG/1; MG/1
1 TABLET ORAL EVERY 4 HOURS PRN
Status: DISCONTINUED | OUTPATIENT
Start: 2021-09-09 | End: 2021-09-11 | Stop reason: HOSPADM

## 2021-09-09 RX ORDER — OXYCODONE AND ACETAMINOPHEN 10; 325 MG/1; MG/1
1 TABLET ORAL EVERY 4 HOURS PRN
Status: DISCONTINUED | OUTPATIENT
Start: 2021-09-09 | End: 2021-09-09 | Stop reason: HOSPADM

## 2021-09-09 RX ORDER — LISINOPRIL 20 MG/1
40 TABLET ORAL DAILY
Status: DISCONTINUED | OUTPATIENT
Start: 2021-09-09 | End: 2021-09-11 | Stop reason: HOSPADM

## 2021-09-09 RX ORDER — ONDANSETRON 2 MG/ML
4 INJECTION INTRAMUSCULAR; INTRAVENOUS ONCE AS NEEDED
Status: DISCONTINUED | OUTPATIENT
Start: 2021-09-09 | End: 2021-09-09 | Stop reason: HOSPADM

## 2021-09-09 RX ORDER — SODIUM CHLORIDE, SODIUM LACTATE, POTASSIUM CHLORIDE, AND CALCIUM CHLORIDE .6; .31; .03; .02 G/100ML; G/100ML; G/100ML; G/100ML
9 INJECTION, SOLUTION INTRAVENOUS CONTINUOUS
Status: DISCONTINUED | OUTPATIENT
Start: 2021-09-09 | End: 2021-09-09 | Stop reason: HOSPADM

## 2021-09-09 RX ORDER — DIPHENHYDRAMINE HYDROCHLORIDE 50 MG/ML
12.5 INJECTION INTRAMUSCULAR; INTRAVENOUS
Status: DISCONTINUED | OUTPATIENT
Start: 2021-09-09 | End: 2021-09-09 | Stop reason: HOSPADM

## 2021-09-09 RX ORDER — LIDOCAINE HYDROCHLORIDE 10 MG/ML
0.5 INJECTION, SOLUTION EPIDURAL; INFILTRATION; INTRACAUDAL; PERINEURAL ONCE AS NEEDED
Status: DISCONTINUED | OUTPATIENT
Start: 2021-09-09 | End: 2021-09-09 | Stop reason: HOSPADM

## 2021-09-09 RX ORDER — DEXAMETHASONE SODIUM PHOSPHATE 10 MG/ML
INJECTION INTRAMUSCULAR; INTRAVENOUS AS NEEDED
Status: DISCONTINUED | OUTPATIENT
Start: 2021-09-09 | End: 2021-09-09 | Stop reason: SURG

## 2021-09-09 RX ORDER — HYDRALAZINE HYDROCHLORIDE 20 MG/ML
5 INJECTION INTRAMUSCULAR; INTRAVENOUS
Status: DISCONTINUED | OUTPATIENT
Start: 2021-09-09 | End: 2021-09-09 | Stop reason: HOSPADM

## 2021-09-09 RX ORDER — SODIUM CHLORIDE 0.9 % (FLUSH) 0.9 %
3-10 SYRINGE (ML) INJECTION AS NEEDED
Status: DISCONTINUED | OUTPATIENT
Start: 2021-09-09 | End: 2021-09-09 | Stop reason: HOSPADM

## 2021-09-09 RX ORDER — PANTOPRAZOLE SODIUM 40 MG/1
40 TABLET, DELAYED RELEASE ORAL EVERY MORNING
Refills: 1 | Status: DISCONTINUED | OUTPATIENT
Start: 2021-09-10 | End: 2021-09-11 | Stop reason: HOSPADM

## 2021-09-09 RX ORDER — AMOXICILLIN 250 MG
2 CAPSULE ORAL 2 TIMES DAILY
Status: DISCONTINUED | OUTPATIENT
Start: 2021-09-09 | End: 2021-09-11 | Stop reason: HOSPADM

## 2021-09-09 RX ORDER — CEFAZOLIN SODIUM 2 G/100ML
INJECTION, SOLUTION INTRAVENOUS AS NEEDED
Status: DISCONTINUED | OUTPATIENT
Start: 2021-09-09 | End: 2021-09-09 | Stop reason: SURG

## 2021-09-09 RX ORDER — ONDANSETRON 2 MG/ML
4 INJECTION INTRAMUSCULAR; INTRAVENOUS EVERY 6 HOURS PRN
Status: DISCONTINUED | OUTPATIENT
Start: 2021-09-09 | End: 2021-09-11 | Stop reason: HOSPADM

## 2021-09-09 RX ORDER — NEOSTIGMINE METHYLSULFATE 0.5 MG/ML
INJECTION, SOLUTION INTRAVENOUS AS NEEDED
Status: DISCONTINUED | OUTPATIENT
Start: 2021-09-09 | End: 2021-09-09 | Stop reason: SURG

## 2021-09-09 RX ORDER — LIDOCAINE HYDROCHLORIDE 20 MG/ML
INJECTION, SOLUTION INFILTRATION; PERINEURAL AS NEEDED
Status: DISCONTINUED | OUTPATIENT
Start: 2021-09-09 | End: 2021-09-09 | Stop reason: SURG

## 2021-09-09 RX ORDER — ROCURONIUM BROMIDE 10 MG/ML
INJECTION, SOLUTION INTRAVENOUS AS NEEDED
Status: DISCONTINUED | OUTPATIENT
Start: 2021-09-09 | End: 2021-09-09 | Stop reason: SURG

## 2021-09-09 RX ORDER — FAMOTIDINE 10 MG/ML
20 INJECTION, SOLUTION INTRAVENOUS ONCE
Status: COMPLETED | OUTPATIENT
Start: 2021-09-09 | End: 2021-09-09

## 2021-09-09 RX ORDER — HYDRALAZINE HYDROCHLORIDE 20 MG/ML
INJECTION INTRAMUSCULAR; INTRAVENOUS AS NEEDED
Status: DISCONTINUED | OUTPATIENT
Start: 2021-09-09 | End: 2021-09-09 | Stop reason: SURG

## 2021-09-09 RX ORDER — HYDROMORPHONE HYDROCHLORIDE 1 MG/ML
0.5 INJECTION, SOLUTION INTRAMUSCULAR; INTRAVENOUS; SUBCUTANEOUS
Status: DISCONTINUED | OUTPATIENT
Start: 2021-09-09 | End: 2021-09-09 | Stop reason: HOSPADM

## 2021-09-09 RX ORDER — BISACODYL 10 MG
10 SUPPOSITORY, RECTAL RECTAL DAILY
Status: DISCONTINUED | OUTPATIENT
Start: 2021-09-10 | End: 2021-09-11 | Stop reason: HOSPADM

## 2021-09-09 RX ADMIN — KETAMINE HYDROCHLORIDE 10 MG: 10 INJECTION INTRAMUSCULAR; INTRAVENOUS at 17:16

## 2021-09-09 RX ADMIN — FENTANYL CITRATE 50 MCG: 0.05 INJECTION, SOLUTION INTRAMUSCULAR; INTRAVENOUS at 19:34

## 2021-09-09 RX ADMIN — ROCURONIUM BROMIDE 10 MG: 50 INJECTION INTRAVENOUS at 15:48

## 2021-09-09 RX ADMIN — SODIUM CHLORIDE, POTASSIUM CHLORIDE, SODIUM LACTATE AND CALCIUM CHLORIDE 250 ML: 600; 310; 30; 20 INJECTION, SOLUTION INTRAVENOUS at 18:32

## 2021-09-09 RX ADMIN — SODIUM CHLORIDE, POTASSIUM CHLORIDE, SODIUM LACTATE AND CALCIUM CHLORIDE 9 ML/HR: 600; 310; 30; 20 INJECTION, SOLUTION INTRAVENOUS at 13:40

## 2021-09-09 RX ADMIN — ONDANSETRON 4 MG: 2 INJECTION INTRAMUSCULAR; INTRAVENOUS at 18:22

## 2021-09-09 RX ADMIN — SODIUM CHLORIDE 150 ML/HR: 9 INJECTION, SOLUTION INTRAVENOUS at 21:44

## 2021-09-09 RX ADMIN — KETAMINE HYDROCHLORIDE 20 MG: 10 INJECTION INTRAMUSCULAR; INTRAVENOUS at 15:21

## 2021-09-09 RX ADMIN — FENTANYL CITRATE 50 MCG: 50 INJECTION INTRAMUSCULAR; INTRAVENOUS at 16:13

## 2021-09-09 RX ADMIN — FENTANYL CITRATE 50 MCG: 50 INJECTION INTRAMUSCULAR; INTRAVENOUS at 15:15

## 2021-09-09 RX ADMIN — CEFAZOLIN SODIUM 2 G: 2 INJECTION, SOLUTION INTRAVENOUS at 14:58

## 2021-09-09 RX ADMIN — FENTANYL CITRATE 50 MCG: 50 INJECTION INTRAMUSCULAR; INTRAVENOUS at 16:27

## 2021-09-09 RX ADMIN — HYDROMORPHONE HYDROCHLORIDE 0.5 MG: 1 INJECTION, SOLUTION INTRAMUSCULAR; INTRAVENOUS; SUBCUTANEOUS at 19:50

## 2021-09-09 RX ADMIN — HYDRALAZINE HYDROCHLORIDE 10 MG: 20 INJECTION, SOLUTION INTRAMUSCULAR; INTRAVENOUS at 17:20

## 2021-09-09 RX ADMIN — FENTANYL CITRATE 50 MCG: 50 INJECTION INTRAMUSCULAR; INTRAVENOUS at 15:48

## 2021-09-09 RX ADMIN — HYDROMORPHONE HYDROCHLORIDE 0.5 MG: 2 INJECTION, SOLUTION INTRAMUSCULAR; INTRAVENOUS; SUBCUTANEOUS at 16:01

## 2021-09-09 RX ADMIN — ROCURONIUM BROMIDE 40 MG: 50 INJECTION INTRAVENOUS at 15:15

## 2021-09-09 RX ADMIN — OXYCODONE AND ACETAMINOPHEN 1 TABLET: 5; 325 TABLET ORAL at 21:15

## 2021-09-09 RX ADMIN — PHENYLEPHRINE HYDROCHLORIDE 100 MCG: 10 INJECTION INTRAVENOUS at 15:34

## 2021-09-09 RX ADMIN — FAMOTIDINE 20 MG: 10 INJECTION INTRAVENOUS at 13:40

## 2021-09-09 RX ADMIN — SODIUM CHLORIDE, POTASSIUM CHLORIDE, SODIUM LACTATE AND CALCIUM CHLORIDE 500 ML: 600; 310; 30; 20 INJECTION, SOLUTION INTRAVENOUS at 15:43

## 2021-09-09 RX ADMIN — DEXAMETHASONE SODIUM PHOSPHATE 10 MG: 10 INJECTION INTRAMUSCULAR; INTRAVENOUS at 15:32

## 2021-09-09 RX ADMIN — LIDOCAINE HYDROCHLORIDE 100 MG: 20 INJECTION, SOLUTION INFILTRATION; PERINEURAL at 15:15

## 2021-09-09 RX ADMIN — CEFAZOLIN SODIUM 2 G: 2 INJECTION, SOLUTION INTRAVENOUS at 15:24

## 2021-09-09 RX ADMIN — NEOSTIGMINE METHYLSULFATE 4 MG: 0.5 INJECTION INTRAVENOUS at 18:32

## 2021-09-09 RX ADMIN — PROPOFOL 180 MG: 10 INJECTION, EMULSION INTRAVENOUS at 15:15

## 2021-09-09 RX ADMIN — FENTANYL CITRATE 50 MCG: 0.05 INJECTION, SOLUTION INTRAMUSCULAR; INTRAVENOUS at 19:15

## 2021-09-09 RX ADMIN — SODIUM CHLORIDE, POTASSIUM CHLORIDE, SODIUM LACTATE AND CALCIUM CHLORIDE 9 ML/HR: 600; 310; 30; 20 INJECTION, SOLUTION INTRAVENOUS at 13:41

## 2021-09-09 RX ADMIN — GLYCOPYRROLATE 0.4 MG: 0.2 INJECTION INTRAMUSCULAR; INTRAVENOUS at 18:32

## 2021-09-09 RX ADMIN — DOCUSATE SODIUM 50MG AND SENNOSIDES 8.6MG 2 TABLET: 8.6; 5 TABLET, FILM COATED ORAL at 21:15

## 2021-09-09 RX ADMIN — KETAMINE HYDROCHLORIDE 20 MG: 10 INJECTION INTRAMUSCULAR; INTRAVENOUS at 16:14

## 2021-09-09 NOTE — ANESTHESIA PROCEDURE NOTES
Airway  Urgency: elective    Date/Time: 9/9/2021 3:16 PM  Airway not difficult    General Information and Staff    Patient location during procedure: OR  Anesthesiologist: Selin Cee MD    Indications and Patient Condition  Indications for airway management: airway protection    Preoxygenated: yes  Mask difficulty assessment: 1 - vent by mask    Final Airway Details  Final airway type: endotracheal airway      Successful airway: ETT  Cuffed: yes   Successful intubation technique: direct laryngoscopy  Endotracheal tube insertion site: oral  Blade: Diana  Blade size: 3  ETT size (mm): 7.5  Cormack-Lehane Classification: grade I - full view of glottis  Placement verified by: chest auscultation and capnometry   Number of attempts at approach: 1  Assessment: lips, teeth, and gum same as pre-op and atraumatic intubation

## 2021-09-09 NOTE — ANESTHESIA PREPROCEDURE EVALUATION
Anesthesia Evaluation     Patient summary reviewed and Nursing notes reviewed                Airway   Mallampati: III  TM distance: >3 FB  Possible difficult intubation  Dental      Pulmonary    (+) asthma,sleep apnea on CPAP,   Cardiovascular     ECG reviewed  Rhythm: regular  Rate: normal    (+) hypertension,       Neuro/Psych- negative ROS  GI/Hepatic/Renal/Endo    (+) obesity,       Musculoskeletal (-) negative ROS    Abdominal    Substance History - negative use     OB/GYN negative ob/gyn ROS         Other                        Anesthesia Plan    ASA 3     general   (CPAP for sleep apnea  BMI  Grade 2 airway view with DL    CMAC available  I have reviewed the patient's history with the patient and the chart, including all pertinent laboratory results and imaging. I have explained the risks of anesthesia including but not limited to dental damage, corneal abrasion, nerve injury, MI, stroke, and death. Questions asked and answered. Anesthetic plan discussed with patient and team as indicated. Patient expressed understanding of the above.  )  intravenous induction     Anesthetic plan, all risks, benefits, and alternatives have been provided, discussed and informed consent has been obtained with: patient.

## 2021-09-09 NOTE — OP NOTE
Operative Report     [facility]    Patient: Tito Jackson  Age:      61 y.o.  :     1960  Sex:      male    Medical Record:  [id number]    Date of Operation/Procedure:  2021    Pre-op Diagnosis:   Left renal mass    Post-Op Diagnosis Codes:   Same    Pre-operative Diagnosis Free Text:  * No pre-op diagnosis entered *     Name of Operation/Procedure:  Procedure(s) and Anesthesia Type:     * NEPHRECTOMY PARTIAL LAPAROSCOPIC WITH DAVINCI ROBOT - General    Findings/Complications:  No complications    Description of procedure: After informed consent was obtained the patient was taken to the operating and general anesthesia was induced. He was placed in left lateral position. A Eastman catheter was placed. The patient was prepped and draped in a standard fashion. A timeout was performed and all team members were in agreement. A Veress needle was passed in the left upper quadrant. This was used for insufflation. Once 4-quarter insufflation was observed the procedure was commenced. The camera port was placed via visiport. Once the camera was inserted the peritoneal cavity was carefully examined. There were no adhesions in the left upper quadrant. The additional robotic ports were placed. Once all ports were in place the left colon was reflected medially. Connecting bands between the colonic mesentery and Gerota's fascia were divided. The splenorenal ligaments were divided and the spleen was retracted cephalad. Once the colon was adequately mobilized, the left gonadal vein was identified. This was dissected upwards to the renal vein. The hilum was carefully dissected, identifying the main renal artery. We then turned our attention to clearing the kidney of perirenal fat.    The kidney was carefully inspected, and after dissecting off surrounding fat a large renal mass corresponding to the one on CT scan was seen on the lateral aspect of the lower pole of the left kidney. The renal ultrasound probe was passed  through the assistant port and guided with the robotic hands to visualize the entire kidney with findings as above. The renal mass was circumscribed along the renal capsule. Then we turned our attention to clamping the vessel. A curved clamp was placed on the main renal artery. Thereafter the renal mass was sharply excised, with care to keep a margin around the tumor tissue. Care was taken to divide deeply to ensure a negative margin. Once the mass was excised it was placed in an Endo Catch bag. Three frozen sections of the tumor base was then was sent to the pathologist, who found only benign renal tissue. We then closed the renal defect. A 3-0 Stratafix suture was passed through the capsule, along the base of the defect, and through the opposite capsule. Once this was tightened down, appropriate hemostasis was seen. The capsule was then reinforced using 2-0 Vicryl suture. Weck sliders were used to allow the appropriate tension. Lapra-ty's were used to hold the tension. The clamp was then removed. Hemostasis was excellent. FloSeal was placed over the tumor defect. Perirenal fat was closed over the tumor bed. A retroperitoneal drain was placed through the lateral robotic port. The abdomen was then desufflated. The renal mass was brought through the 12 mm assistant port. The fascia for this incision was closed with O Vicryl suture. The skin was then closed using 4-0 Vicryl suture. The patient remained stable throughout the procedure. She was transferred to the recovery room in stable condition.     Estimated Blood Loss: 200ml    Specimens:   Order Name Source Comment Collection Info Order Time   TISSUE PATHOLOGY EXAM Kidney, Left  Collected By: Jarad bArams Jr., MD 9/9/2021  5:29 PM     Release to patient   Immediate            Fluids/Drains: peritoneal CRISTIANO    Jarad Abrams Jr., MD  9/9/2021  18:36 EDT

## 2021-09-09 NOTE — H&P
FIRST UROLOGY CONSULT      Patient Identification:  NAME:  Tito Jackson  Age:  61 y.o.   Sex:  male   :  1960   MRN:  [id number]       Chief complaint: Left renal mass    History of present illness:  This is a 61 year old man with a history of a left renal mass here for robotic left partial nephrectomy. We discussed the risks of the procedure including bleeding, infection, damage to surrounding structures, pain, need for additional procedures, need to perform an open procedure, recurrence of disease, need for dialysis, thromboembolism, MI, stroke, coma, death. He understands these risks and would like to proceed.      Past medical history:  Past Medical History:   Diagnosis Date   • Allergic rhinitis 2016    Patient had remote allergy testing as a child but never received immunotherapy except shots for poison ivy.   • COVID-19 virus infection 2021   • Diverticulosis of colon 2009--colonoscopy reveals multiple large and small mouth diverticula in sigmoid colon. The rest of the colon was normal.   • Dyslipidemia 2015--NMR reveals a total cholesterol 140. Triglycerides elevated at 203. LDL particle number excellent at 965. Small LDL particle number elevated at 747. HDL particle number low at 26.0.   • Gastroesophageal reflux disease with esophagitis 2009--EGD revealed LA grade a esophagitis with no evidence of bleeding. Biopsies taken. A small hiatal hernia was present. Diffuse mildly erythematous mucosa with no bleeding was found in the stomach. Biopsies taken. Biopsy sent for H. pylori. No gross lesions were noted in the entire duodenum. Random gastric biopsies returned fragments of unremarkablle gastric mucosa. Hyperplastic polypoid gastric mucosa, fundic type. H pylori negative. Distal esophagitis biopsies revealed focus of acute inflammation with evidence of mucosal erosion/ulcer. Strips of squamous epithelium with focal  parakeratosis. Special stains for fungi were negative.   • History of herpes genitalis Approximately 2001    Approximately 2001--patient had an initial outbreak of genital herpes and has not had a recurrent since.   • History of pneumonia 2002 08/29/2002--bronchoscopy was performed with brushings and biopsies which were negative for malignant cells. Watching showed only normal respiratory breana. AFB and fungal smears negative. The thoracic surgeon wanted to do a VATS procedure and patient refused. His adenopathy as well as infiltrate subsequently resolved. No further recurrence.  08/09/2002--CT scan of the chest performed for abnormal c   • Hypertension    • Impaired fasting glucose 8/20/2015 08/20/2015--serum glucose 109. Recommend diet, weight loss, exercise.   • Migraine headaches 2/23/2016   • Mild intermittent asthma 2/23/2016   • Multiple environmental allergies 2/23/2016    Patient had remote allergy testing as a child but never received immunotherapy except shots for poison ivy.   • Renal mass, left    • Sleep apnea     USES CPAP.. INSTRUCTED TO BRING DOS    • Syncope and collapse 08/04/2021   • Vitamin D deficiency 2/23/2016       Past surgical history:  Past Surgical History:   Procedure Laterality Date   • APPENDECTOMY  Childhood    As a child   • BRONCHOSCOPY  08/29/2002 08/29/2002--bronchoscopy was performed with brushings and biopsies which were negative for malignant cells. Watching showed only normal respiratory breana. AFB and fungal smears negative. The thoracic surgeon wanted to do a VATS procedure and patient refused. His adenopathy as well as infiltrate subsequently resolved. No further recurrence.  08/09/2002--CT scan of the chest performed for abnormal c   • CHOLECYSTECTOMY  07/29/2021    [facility]   • COLONOSCOPY  02/27/2009 02/27/2009--colonoscopy reveals multiple large and small mouth diverticula in sigmoid colon. The rest of the colon was normal.   • COLONOSCOPY N/A 12/6/2019     Procedure: COLONOSCOPY into cecum and TI with cold biopsy polypectomies;  Surgeon: Marques Gonzalez MD;  Location: [facility] ENDOSCOPY;  Service: Gastroenterology   • DACRYOCYSTORHINOSTOMY Right 07/12/2012 07/12/2012--dacryocystorhinostomy of the right eye with silicone intubation of lacrimal drainage system. Patient's symptoms have totally resolved   • ENDOSCOPY N/A 12/6/2019    Procedure: ESOPHAGOGASTRODUODENOSCOPY with biopsies and dilation of esophageal stricture using 15-18 balloon;  Surgeon: Marques Gonzalez MD;  Location: [facility] ENDOSCOPY;  Service: Gastroenterology   • ESOPHAGOSCOPY / EGD  02/27/2009 02/27/2009--EGD revealed LA grade a esophagitis with no evidence of bleeding. Biopsies taken. A small hiatal hernia was present. Diffuse mildly erythematous mucosa with no bleeding was found in the stomach. Biopsies taken. Biopsy sent for H. pylori. No gross lesions were noted in the entire duodenum. Random gastric biopsies returned fragments of unremarkablle gastric mucosa. Hyperplastic polypoid    • SKIN SURGERY  12/15/2008    12/15/2008--patient had a total of 7 lesions removed from his face and clavicle. Pathology returned seborrheic keratoses inflamed on the clavicle.   • TONSILLECTOMY  Childhood    As a child       Allergies:  Patient has no known allergies.    Home medications:  Medications Prior to Admission   Medication Sig Dispense Refill Last Dose   • lisinopril (PRINIVIL,ZESTRIL) 40 MG tablet Take 40 mg by mouth Daily.   9/7/2021   • olmesartan-hydrochlorothiazide (BENICAR HCT) 40-25 MG per tablet Take 1 tablet by mouth Daily.   9/7/2021   • omeprazole (priLOSEC) 40 MG capsule Take 1 capsule by mouth Daily. 90 capsule 1 9/7/2021   • albuterol (PROVENTIL HFA;VENTOLIN HFA) 108 (90 BASE) MCG/ACT inhaler Proventil  (90 Base) MCG/ACT Inhalation Aerosol Solution; Patient Sig: Proventil  (90 Base) MCG/ACT Inhalation Aerosol Solution 1-2 puffs every 4 hours when necessary asthma; 1; 5;  2014; Active   More than a month at Unknown time   • HYDROcodone-acetaminophen (NORCO) 5-325 MG per tablet Take 1 tablet by mouth Every 6 (Six) Hours As Needed.   More than a month at Unknown time        Hospital medications:  ceFAZolin, 2 g, Intravenous, Once  sodium chloride, 3 mL, Intravenous, Q12H      lactated ringers, 9 mL/hr, Last Rate: 9 mL/hr (21 1340)  lactated ringers, 9 mL/hr, Last Rate: 9 mL/hr (21 1341)      fentanyl  •  lidocaine PF 1%  •  midazolam  •  sodium chloride    Family history:  Family History   Problem Relation Age of Onset   • Diabetes Father         Type 2   • Malig Hyperthermia Neg Hx        Social history:  Social History     Tobacco Use   • Smoking status: Never Smoker   • Smokeless tobacco: Never Used   Vaping Use   • Vaping Use: Never used   Substance Use Topics   • Alcohol use: No   • Drug use: No       Review of systems:      Positive for:  As per HPi  Negative for:  As per hPI    Objective:  TMax 24 hours:   Temp (24hrs), Av.6 °F (37 °C), Min:98.6 °F (37 °C), Max:98.6 °F (37 °C)      Vitals Ranges:   Temp:  [98.6 °F (37 °C)] 98.6 °F (37 °C)  Heart Rate:  [79] 79  Resp:  [18] 18  BP: (128)/(91) 128/91    Intake/Output Last 3 shifts:  No intake/output data recorded.     Physical Exam:    General Appearance:    Alert, cooperative, NAD                                       Neuro/Psych:   Orientation intact, mood/affect pleasant, no focal findings       Results review:   I reviewed the patient's new clinical results.    Data review:  Lab Results (last 24 hours)     ** No results found for the last 24 hours. **           Imaging:  Imaging Results (Last 24 Hours)     ** No results found for the last 24 hours. **             Assessment:       Left renal mass        Plan:     Left robotic partial nephrectomy    Jarad Abrams Jr., MD  21  14:53 EDT

## 2021-09-09 NOTE — ANESTHESIA POSTPROCEDURE EVALUATION
"Patient: Tito Jackson    Procedure Summary     Date: 09/09/21 Room / Location: [facility] OR 07 / [facility] MAIN OR    Anesthesia Start: 1502 Anesthesia Stop: 1900    Procedure: NEPHRECTOMY PARTIAL LAPAROSCOPIC WITH DAVINCI ROBOT (Left Abdomen) Diagnosis:     Surgeons: Jarad Abrams Jr., MD Provider: Selin Cee MD    Anesthesia Type: general ASA Status: 3          Anesthesia Type: general    Vitals  Vitals Value Taken Time   /74 09/09/21 1931   Temp 36.6 °C (97.9 °F) 09/09/21 1856   Pulse 64 09/09/21 1937   Resp 16 09/09/21 1930   SpO2 96 % 09/09/21 1937   Vitals shown include unvalidated device data.        Post Anesthesia Care and Evaluation    Patient location during evaluation: bedside  Patient participation: complete - patient participated  Level of consciousness: awake and alert  Pain management: adequate  Airway patency: patent  Anesthetic complications: No anesthetic complications    Cardiovascular status: acceptable  Respiratory status: acceptable  Hydration status: acceptable    Comments: /74   Pulse 55   Temp 36.6 °C (97.9 °F) (Oral)   Resp 16   Ht 172.7 cm (68\")   Wt 92.6 kg (204 lb 2.3 oz)   SpO2 96%   BMI 31.04 kg/m²           "

## 2021-09-10 LAB
ANION GAP SERPL CALCULATED.3IONS-SCNC: 14.3 MMOL/L (ref 5–15)
BUN SERPL-MCNC: 29 MG/DL (ref 8–23)
BUN/CREAT SERPL: 14.4 (ref 7–25)
CALCIUM SPEC-SCNC: 8.2 MG/DL (ref 8.6–10.5)
CHLORIDE SERPL-SCNC: 104 MMOL/L (ref 98–107)
CO2 SERPL-SCNC: 19.7 MMOL/L (ref 22–29)
CREAT SERPL-MCNC: 2.02 MG/DL (ref 0.76–1.27)
DEPRECATED RDW RBC AUTO: 45.4 FL (ref 37–54)
ERYTHROCYTE [DISTWIDTH] IN BLOOD BY AUTOMATED COUNT: 13.9 % (ref 12.3–15.4)
GFR SERPL CREATININE-BSD FRML MDRD: 34 ML/MIN/1.73
GLUCOSE SERPL-MCNC: 132 MG/DL (ref 65–99)
HCT VFR BLD AUTO: 37.3 % (ref 37.5–51)
HGB BLD-MCNC: 12.4 G/DL (ref 13–17.7)
MCH RBC QN AUTO: 29.8 PG (ref 26.6–33)
MCHC RBC AUTO-ENTMCNC: 33.2 G/DL (ref 31.5–35.7)
MCV RBC AUTO: 89.7 FL (ref 79–97)
PLATELET # BLD AUTO: 243 10*3/MM3 (ref 140–450)
PMV BLD AUTO: 11.4 FL (ref 6–12)
POTASSIUM SERPL-SCNC: 4.6 MMOL/L (ref 3.5–5.2)
RBC # BLD AUTO: 4.16 10*6/MM3 (ref 4.14–5.8)
SODIUM SERPL-SCNC: 138 MMOL/L (ref 136–145)
WBC # BLD AUTO: 15.17 10*3/MM3 (ref 3.4–10.8)

## 2021-09-10 PROCEDURE — 85027 COMPLETE CBC AUTOMATED: CPT | Performed by: UROLOGY

## 2021-09-10 PROCEDURE — 80048 BASIC METABOLIC PNL TOTAL CA: CPT | Performed by: UROLOGY

## 2021-09-10 PROCEDURE — 25010000002 HYDROMORPHONE PER 4 MG: Performed by: UROLOGY

## 2021-09-10 PROCEDURE — G0378 HOSPITAL OBSERVATION PER HR: HCPCS

## 2021-09-10 RX ORDER — ACETAMINOPHEN 325 MG/1
650 TABLET ORAL EVERY 6 HOURS PRN
Status: DISCONTINUED | OUTPATIENT
Start: 2021-09-10 | End: 2021-09-11 | Stop reason: HOSPADM

## 2021-09-10 RX ADMIN — OXYCODONE AND ACETAMINOPHEN 1 TABLET: 5; 325 TABLET ORAL at 21:23

## 2021-09-10 RX ADMIN — BISACODYL 10 MG: 10 SUPPOSITORY RECTAL at 08:03

## 2021-09-10 RX ADMIN — OXYCODONE AND ACETAMINOPHEN 1 TABLET: 5; 325 TABLET ORAL at 16:16

## 2021-09-10 RX ADMIN — OXYCODONE AND ACETAMINOPHEN 1 TABLET: 5; 325 TABLET ORAL at 02:43

## 2021-09-10 RX ADMIN — SODIUM CHLORIDE 1000 ML: 9 INJECTION, SOLUTION INTRAVENOUS at 06:25

## 2021-09-10 RX ADMIN — HYDROMORPHONE HYDROCHLORIDE 0.5 MG: 1 INJECTION, SOLUTION INTRAMUSCULAR; INTRAVENOUS; SUBCUTANEOUS at 00:38

## 2021-09-10 RX ADMIN — ACETAMINOPHEN 650 MG: 325 TABLET, FILM COATED ORAL at 08:02

## 2021-09-10 RX ADMIN — SODIUM CHLORIDE 150 ML/HR: 9 INJECTION, SOLUTION INTRAVENOUS at 03:30

## 2021-09-10 RX ADMIN — ACETAMINOPHEN 650 MG: 325 TABLET, FILM COATED ORAL at 14:34

## 2021-09-10 RX ADMIN — PANTOPRAZOLE SODIUM 40 MG: 40 TABLET, DELAYED RELEASE ORAL at 06:23

## 2021-09-10 RX ADMIN — SODIUM CHLORIDE 150 ML/HR: 9 INJECTION, SOLUTION INTRAVENOUS at 21:36

## 2021-09-10 RX ADMIN — SODIUM CHLORIDE 150 ML/HR: 9 INJECTION, SOLUTION INTRAVENOUS at 12:45

## 2021-09-10 RX ADMIN — DOCUSATE SODIUM 50MG AND SENNOSIDES 8.6MG 2 TABLET: 8.6; 5 TABLET, FILM COATED ORAL at 21:23

## 2021-09-10 RX ADMIN — DOCUSATE SODIUM 50MG AND SENNOSIDES 8.6MG 2 TABLET: 8.6; 5 TABLET, FILM COATED ORAL at 08:02

## 2021-09-10 NOTE — PROGRESS NOTES
POD1 s/p robotic left partial nephrectomy    Comfortable. No nausea.    AVSS  Good UO  Abd soft, nondistended  Inc c/d/i    Labs appropriate.    Plan:  - ambulate  - advance diet  - dulcolax suppository  - possible discharge this afternoon

## 2021-09-10 NOTE — PLAN OF CARE
Goal Outcome Evaluation:           Progress: improving  Outcome Summary: Post op L partial nephrectomy. 4 abd lap sites w/ dermabond that are c/d/i. L asia w/ no output yet. [place] to be d/c this AM. Some c/o pain, percocet and dilaudid given. VSS. Will continue to monitor.

## 2021-09-10 NOTE — PLAN OF CARE
Goal Outcome Evaluation:   Patient doing well this shift.  Ambulated in room & up to chair most of day.  Urinating well.  CRISTIANO with drainage at site, minimal in drain.  4 laps with dermabond.  Tolerating full liquid diet.  Bowel sounds still hypoactive.  Passing gas, no BM.  Suppository admnistered.  Tylenol given for pain as requested.  VSS.  Will continue to monitor.

## 2021-09-11 ENCOUNTER — READMISSION MANAGEMENT (OUTPATIENT)
Dept: CALL CENTER | Facility: HOSPITAL | Age: 61
End: 2021-09-11

## 2021-09-11 VITALS
OXYGEN SATURATION: 94 % | SYSTOLIC BLOOD PRESSURE: 114 MMHG | HEART RATE: 86 BPM | DIASTOLIC BLOOD PRESSURE: 74 MMHG | WEIGHT: 204.15 LBS | HEIGHT: 68 IN | BODY MASS INDEX: 30.94 KG/M2 | TEMPERATURE: 97.7 F | RESPIRATION RATE: 16 BRPM

## 2021-09-11 LAB
ANION GAP SERPL CALCULATED.3IONS-SCNC: 9.5 MMOL/L (ref 5–15)
BUN SERPL-MCNC: 27 MG/DL (ref 8–23)
BUN/CREAT SERPL: 14.4 (ref 7–25)
CALCIUM SPEC-SCNC: 7.9 MG/DL (ref 8.6–10.5)
CHLORIDE SERPL-SCNC: 109 MMOL/L (ref 98–107)
CO2 SERPL-SCNC: 21.5 MMOL/L (ref 22–29)
CREAT SERPL-MCNC: 1.88 MG/DL (ref 0.76–1.27)
DEPRECATED RDW RBC AUTO: 45.6 FL (ref 37–54)
ERYTHROCYTE [DISTWIDTH] IN BLOOD BY AUTOMATED COUNT: 13.8 % (ref 12.3–15.4)
GFR SERPL CREATININE-BSD FRML MDRD: 36 ML/MIN/1.73
GLUCOSE SERPL-MCNC: 98 MG/DL (ref 65–99)
HCT VFR BLD AUTO: 30.3 % (ref 37.5–51)
HGB BLD-MCNC: 9.9 G/DL (ref 13–17.7)
MCH RBC QN AUTO: 29.6 PG (ref 26.6–33)
MCHC RBC AUTO-ENTMCNC: 32.7 G/DL (ref 31.5–35.7)
MCV RBC AUTO: 90.7 FL (ref 79–97)
PLATELET # BLD AUTO: 166 10*3/MM3 (ref 140–450)
PMV BLD AUTO: 11.3 FL (ref 6–12)
POTASSIUM SERPL-SCNC: 4.1 MMOL/L (ref 3.5–5.2)
RBC # BLD AUTO: 3.34 10*6/MM3 (ref 4.14–5.8)
SODIUM SERPL-SCNC: 140 MMOL/L (ref 136–145)
WBC # BLD AUTO: 10.76 10*3/MM3 (ref 3.4–10.8)

## 2021-09-11 PROCEDURE — 80048 BASIC METABOLIC PNL TOTAL CA: CPT | Performed by: UROLOGY

## 2021-09-11 PROCEDURE — 25010000002 HYDROMORPHONE PER 4 MG: Performed by: UROLOGY

## 2021-09-11 PROCEDURE — G0378 HOSPITAL OBSERVATION PER HR: HCPCS

## 2021-09-11 PROCEDURE — 85027 COMPLETE CBC AUTOMATED: CPT | Performed by: UROLOGY

## 2021-09-11 RX ORDER — HYDROCODONE BITARTRATE AND ACETAMINOPHEN 7.5; 325 MG/1; MG/1
1 TABLET ORAL EVERY 6 HOURS PRN
Qty: 20 TABLET | Refills: 0 | Status: SHIPPED | OUTPATIENT
Start: 2021-09-11 | End: 2022-05-09

## 2021-09-11 RX ADMIN — HYDROMORPHONE HYDROCHLORIDE 0.5 MG: 1 INJECTION, SOLUTION INTRAMUSCULAR; INTRAVENOUS; SUBCUTANEOUS at 01:19

## 2021-09-11 RX ADMIN — SODIUM CHLORIDE 150 ML/HR: 9 INJECTION, SOLUTION INTRAVENOUS at 03:03

## 2021-09-11 RX ADMIN — OXYCODONE AND ACETAMINOPHEN 1 TABLET: 5; 325 TABLET ORAL at 07:21

## 2021-09-11 RX ADMIN — OXYCODONE AND ACETAMINOPHEN 1 TABLET: 5; 325 TABLET ORAL at 01:19

## 2021-09-11 RX ADMIN — ACETAMINOPHEN 650 MG: 325 TABLET, FILM COATED ORAL at 03:17

## 2021-09-11 RX ADMIN — DOCUSATE SODIUM 50MG AND SENNOSIDES 8.6MG 2 TABLET: 8.6; 5 TABLET, FILM COATED ORAL at 08:54

## 2021-09-11 RX ADMIN — PANTOPRAZOLE SODIUM 40 MG: 40 TABLET, DELAYED RELEASE ORAL at 05:34

## 2021-09-11 NOTE — PROGRESS NOTES
"   LOS: 0 days   Patient Care Team:  Wade Butcher MD as PCP - General (Internal Medicine)      Subjective   Interval History: POD 2  VSS  healing    Objective         Vital Signs  Temp:  [97.5 °F (36.4 °C)-97.8 °F (36.6 °C)] 97.7 °F (36.5 °C)  Heart Rate:  [82-88] 86  Resp:  [16] 16  BP: (114-120)/(69-74) 114/74      Intake/Output Summary (Last 24 hours) at 9/11/2021 1306  Last data filed at 9/11/2021 0855  Gross per 24 hour   Intake 1875.41 ml   Output 1345 ml   Net 530.41 ml       Flowsheet Rows      First Filed Value   Admission Height  170.2 cm (67\") Documented at 09/08/2021 1218   Admission Weight  86.2 kg (190 lb) Documented at 09/08/2021 1218          Physical Exam:     General cecil: alert, cooperative, oriented  Lungs:  Clear, No acute distress  Heart:  Regular, rate and rhythm  ABD:  Soft and nt  Genitalia:  normal  Extremities: normal, no edema  Skin:  Skin color, texture, turgor normal, no rashes        Results Review:      Lab Results (all)     Procedure Component Value Units Date/Time    Basic Metabolic Panel [[id number]]  (Abnormal) Collected: 09/11/21 0802    Specimen: Blood Updated: 09/11/21 0914     Glucose 98 mg/dL      BUN 27 mg/dL      Creatinine 1.88 mg/dL      Sodium 140 mmol/L      Potassium 4.1 mmol/L      Chloride 109 mmol/L      CO2 21.5 mmol/L      Calcium 7.9 mg/dL      eGFR Non African Amer 36 mL/min/1.73      BUN/Creatinine Ratio 14.4     Anion Gap 9.5 mmol/L     Narrative:      GFR Normal >60  Chronic Kidney Disease <60  Kidney Failure <15      CBC (No Diff) [[id number]]  (Abnormal) Collected: 09/11/21 0802    Specimen: Blood Updated: 09/11/21 0902     WBC 10.76 10*3/mm3      RBC 3.34 10*6/mm3      Hemoglobin 9.9 g/dL      Hematocrit 30.3 %      MCV 90.7 fL      MCH 29.6 pg      MCHC 32.7 g/dL      RDW 13.8 %      RDW-SD 45.6 fl      MPV 11.3 fL      Platelets 166 10*3/mm3     Basic Metabolic Panel [[id number]]  (Abnormal) Collected: 09/10/21 0836    Specimen: Blood Updated: 09/10/21 " 0936     Glucose 132 mg/dL      BUN 29 mg/dL      Creatinine 2.02 mg/dL      Sodium 138 mmol/L      Potassium 4.6 mmol/L      Chloride 104 mmol/L      CO2 19.7 mmol/L      Calcium 8.2 mg/dL      eGFR Non African Amer 34 mL/min/1.73      BUN/Creatinine Ratio 14.4     Anion Gap 14.3 mmol/L     Narrative:      GFR Normal >60  Chronic Kidney Disease <60  Kidney Failure <15      CBC (No Diff) [[id number]]  (Abnormal) Collected: 09/10/21 0836    Specimen: Blood Updated: 09/10/21 0908     WBC 15.17 10*3/mm3      RBC 4.16 10*6/mm3      Hemoglobin 12.4 g/dL      Hematocrit 37.3 %      MCV 89.7 fL      MCH 29.8 pg      MCHC 33.2 g/dL      RDW 13.9 %      RDW-SD 45.4 fl      MPV 11.4 fL      Platelets 243 10*3/mm3     Tissue Pathology Exam [[id number]] Collected: 09/09/21 1728    Specimen: Tissue from Kidney, Left; Tissue from Kidney, Left; Tissue from Kidney, Left; Tissue from Kidney, Left Updated: 09/09/21 2344    Basic Metabolic Panel [[id number]]  (Abnormal) Collected: 09/09/21 2125    Specimen: Blood Updated: 09/09/21 2219     Glucose 147 mg/dL      BUN 25 mg/dL      Creatinine 1.68 mg/dL      Sodium 136 mmol/L      Potassium 4.0 mmol/L      Chloride 104 mmol/L      CO2 19.4 mmol/L      Calcium 8.5 mg/dL      eGFR Non African Amer 41 mL/min/1.73      BUN/Creatinine Ratio 14.9     Anion Gap 12.6 mmol/L     Narrative:      GFR Normal >60  Chronic Kidney Disease <60  Kidney Failure <15      CBC (No Diff) [[id number]]  (Abnormal) Collected: 09/09/21 2125    Specimen: Blood Updated: 09/09/21 2145     WBC 17.43 10*3/mm3      RBC 4.18 10*6/mm3      Hemoglobin 12.6 g/dL      Hematocrit 38.7 %      MCV 92.6 fL      MCH 30.1 pg      MCHC 32.6 g/dL      RDW 13.9 %      RDW-SD 47.6 fl      MPV 11.3 fL      Platelets 214 10*3/mm3     POC Glucose Once [[id number]]  (Abnormal) Collected: 09/09/21 1858    Specimen: Blood Updated: 09/09/21 1859     Glucose 142 mg/dL      Comment: Meter: [id number] : [id number] Aisha LANCE RN              Imaging Results (All)     None          Medication Review:   Current Facility-Administered Medications   Medication Dose Route Frequency Provider Last Rate Last Admin   • acetaminophen (TYLENOL) tablet 650 mg  650 mg Oral Q6H PRN Jarad Abrams Jr., MD   650 mg at 09/11/21 0317   • albuterol (PROVENTIL) nebulizer solution 0.083% 2.5 mg/3mL  2.5 mg Nebulization Q4H - RT Jarad Abrams Jr., MD       • bisacodyl (DULCOLAX) suppository 10 mg  10 mg Rectal Daily Jarad Abrams Jr., MD   10 mg at 09/10/21 0803   • HYDROmorphone (DILAUDID) injection 0.5 mg  0.5 mg Intravenous Q2H PRN Jarad Abrams Jr., MD   0.5 mg at 09/11/21 0119    And   • naloxone (NARCAN) injection 0.1 mg  0.1 mg Intravenous Q5 Min PRN Jarad Abrams Jr., MD       • lisinopril (PRINIVIL,ZESTRIL) tablet 40 mg  40 mg Oral Daily Jarad Abrams Jr., MD       • losartan (COZAAR) 100 mg, hydroCHLOROthiazide (HYDRODIURIL) 25 mg   Oral Daily Jarad Abrams Jr., MD       • ondansetron (ZOFRAN) tablet 4 mg  4 mg Oral Q6H PRN Jarad Abrams Jr., MD        Or   • ondansetron (ZOFRAN) injection 4 mg  4 mg Intravenous Q6H PRN Jarad Abrams Jr., MD       • oxyCODONE-acetaminophen (PERCOCET) 5-325 MG per tablet 1 tablet  1 tablet Oral Q4H PRN Jarad Abrams Jr., MD   1 tablet at 09/11/21 0721   • pantoprazole (PROTONIX) EC tablet 40 mg  40 mg Oral QAM Jarad Abrams Jr., MD   40 mg at 09/11/21 0534   • sennosides-docusate (PERICOLACE) 8.6-50 MG per tablet 2 tablet  2 tablet Oral BID Jarad Abrams Jr., MD   2 tablet at 09/11/21 0854   • sodium chloride 0.9 % infusion  150 mL/hr Intravenous Continuous Jarad Abrams Jr.,  mL/hr at 09/11/21 1238 150 mL/hr at 09/11/21 1238       Current Facility-Administered Medications:   •  acetaminophen (TYLENOL) tablet 650 mg, 650 mg, Oral, Q6H PRN, Jarad Abrams Jr., MD, 650 mg at 09/11/21 0317  •  albuterol (PROVENTIL) nebulizer  solution 0.083% 2.5 mg/3mL, 2.5 mg, Nebulization, Q4H - RT, Jarad Abrams Jr., MD  •  bisacodyl (DULCOLAX) suppository 10 mg, 10 mg, Rectal, Daily, Jarad Abrams Jr., MD, 10 mg at 09/10/21 0803  •  HYDROmorphone (DILAUDID) injection 0.5 mg, 0.5 mg, Intravenous, Q2H PRN, 0.5 mg at 09/11/21 0119 **AND** naloxone (NARCAN) injection 0.1 mg, 0.1 mg, Intravenous, Q5 Min PRN, Jarad Abrams Jr., MD  •  lisinopril (PRINIVIL,ZESTRIL) tablet 40 mg, 40 mg, Oral, Daily, Jarad Abrams Jr., MD  •  losartan (COZAAR) 100 mg, hydroCHLOROthiazide (HYDRODIURIL) 25 mg, , Oral, Daily, Jarad Abrams Jr., MD  •  ondansetron (ZOFRAN) tablet 4 mg, 4 mg, Oral, Q6H PRN **OR** ondansetron (ZOFRAN) injection 4 mg, 4 mg, Intravenous, Q6H PRN, Jarad Abrams Jr., MD  •  oxyCODONE-acetaminophen (PERCOCET) 5-325 MG per tablet 1 tablet, 1 tablet, Oral, Q4H PRN, Jarad Abrams Jr., MD, 1 tablet at 09/11/21 0721  •  pantoprazole (PROTONIX) EC tablet 40 mg, 40 mg, Oral, QAM, Jarad Abrams Jr., MD, 40 mg at 09/11/21 0534  •  sennosides-docusate (PERICOLACE) 8.6-50 MG per tablet 2 tablet, 2 tablet, Oral, BID, Jarad Abrams Jr., MD, 2 tablet at 09/11/21 0854  •  sodium chloride 0.9 % infusion, 150 mL/hr, Intravenous, Continuous, Jarad Abrams Jr., MD, Last Rate: 150 mL/hr at 09/11/21 1238, 150 mL/hr at 09/11/21 1238  Medications Discontinued During This Encounter   Medication Reason   • Cholecalciferol (VITAMIN D3) 5000 units capsule capsule    • sodium chloride (NS) irrigation solution Patient Discharge   • sterile water irrigation solution Patient Discharge   • lidocaine (XYLOCAINE) 1 % injection Patient Discharge   • sodium chloride 0.9 % flush 3 mL Patient Transfer   • sodium chloride 0.9 % flush 3-10 mL Patient Transfer   • lidocaine PF 1% (XYLOCAINE) injection 0.5 mL Patient Transfer   • fentaNYL citrate (PF) (SUBLIMAZE) injection 50 mcg Patient Transfer   • midazolam (VERSED)  injection 1 mg Patient Transfer   • ibuprofen (ADVIL,MOTRIN) tablet 600 mg Patient Transfer   • HYDROcodone-acetaminophen (NORCO) 7.5-325 MG per tablet 1 tablet Patient Transfer   • HYDROmorphone (DILAUDID) injection 0.5 mg Patient Transfer   • oxyCODONE-acetaminophen (PERCOCET)  MG per tablet 1 tablet Patient Transfer   • fentaNYL citrate (PF) (SUBLIMAZE) injection 50 mcg Patient Transfer   • labetalol (NORMODYNE,TRANDATE) injection 5 mg Patient Transfer   • hydrALAZINE (APRESOLINE) injection 5 mg Patient Transfer   • ePHEDrine injection 5 mg Patient Transfer   • naloxone (NARCAN) injection 0.2 mg Patient Transfer   • flumazenil (ROMAZICON) injection 0.2 mg Patient Transfer   • ondansetron (ZOFRAN) injection 4 mg Patient Transfer   • promethazine (PHENERGAN) suppository 25 mg Patient Transfer   • promethazine (PHENERGAN) tablet 25 mg Patient Transfer   • diphenhydrAMINE (BENADRYL) injection 12.5 mg Patient Transfer   • diphenhydrAMINE (BENADRYL) capsule 25 mg Patient Transfer   • lactated ringers infusion Patient Transfer   • lactated ringers solution Patient Transfer       Assessment/Plan         Left renal mass  POD 2 from partial nephrectomy      Plan home today    Rodrigo Castro Jr., MD  09/11/21  13:06 EDT

## 2021-09-11 NOTE — OUTREACH NOTE
Prep Survey      Responses   [facility] facility patient discharged from?  [place]   Is LACE score < 7 ?  Yes   Emergency Room discharge w/ pulse ox?  No   Eligibility  [facility]   Date of Admission  09/09/21   Date of Discharge  09/11/21   Discharge Disposition  Home or Self Care   Discharge diagnosis  Nephrectomyparital lap    Does the patient have one of the following disease processes/diagnoses(primary or secondary)?  General Surgery   Does the patient have Home health ordered?  No   Is there a DME ordered?  No   Prep survey completed?  Yes          Rashmi Salinas RN

## 2021-09-11 NOTE — PLAN OF CARE
Goal Outcome Evaluation:           Progress: improving  Outcome Summary: Pt Ax1, has ambulated and been up in chair this shift. Pain in ABD, Norco, Tylenol and dilaudid x1 for pain. CRISTIANO drain drainging serosang drainage. VSS. Will continue to monitor.

## 2021-09-11 NOTE — DISCHARGE SUMMARY
Date of Discharge:  9/11/2021    Discharge Diagnosis: left renal mass    Presenting Problem/History of Present Illness  Active Hospital Problems    Diagnosis  POA   • **Left renal mass [N28.89]  Yes      Resolved Hospital Problems   No resolved problems to display.          Hospital Course  Patient is a 61 y.o. male presented with left renal mass  Robotic left partial nephrectomy  stable.      Procedures Performed    Procedure(s):  NEPHRECTOMY PARTIAL LAPAROSCOPIC WITH DAVINCI ROBOT  -------------------       Consults:   Consults     No orders found from 8/11/2021 to 9/10/2021.          Pertinent Test Results: pathology, pending    Condition on Discharge:  stable    Vital Signs  Temp:  [97.5 °F (36.4 °C)-97.8 °F (36.6 °C)] 97.7 °F (36.5 °C)  Heart Rate:  [82-88] 86  Resp:  [16] 16  BP: (114-120)/(69-74) 114/74    Physical Exam:  Gen no fever  Skin neg  Abd healing  HEENT normal  Ext normal  Neuro neg    Discharge Disposition  Home or Self Care    Discharge Medications     Discharge Medications      Changes to Medications      Instructions Start Date   HYDROcodone-acetaminophen 5-325 MG per tablet  Commonly known as: NORCO  What changed: Another medication with the same name was added. Make sure you understand how and when to take each.   1 tablet, Oral, Every 6 Hours PRN      HYDROcodone-acetaminophen 7.5-325 MG per tablet  Commonly known as: [place]  What changed: You were already taking a medication with the same name, and this prescription was added. Make sure you understand how and when to take each.   1 tablet, Oral, Every 6 Hours PRN         Continue These Medications      Instructions Start Date   albuterol sulfate  (90 Base) MCG/ACT inhaler  Commonly known as: PROVENTIL HFA;VENTOLIN HFA;PROAIR HFA   Proventil  (90 Base) MCG/ACT Inhalation Aerosol Solution; Patient Sig: Proventil  (90 Base) MCG/ACT Inhalation Aerosol Solution 1-2 puffs every 4 hours when necessary asthma; 1; 5;  23-Jan-2014; Active      lisinopril 40 MG tablet  Commonly known as: PRINIVIL,ZESTRIL   40 mg, Oral, Daily      olmesartan-hydrochlorothiazide 40-25 MG per tablet  Commonly known as: BENICAR HCT   1 tablet, Oral, Daily      omeprazole 40 MG capsule  Commonly known as: priLOSEC   40 mg, Oral, Daily             Discharge Diet:   Diet Instructions     Advance Diet as Tolerated            Activity at Discharge:     Follow-up Appointments  No future appointments.  Additional Instructions for the Follow-ups that You Need to Schedule     Discharge Follow-up with Specified Provider: Dr Abrams; 1 Week   As directed      To: Dr Abrams    Follow Up: 1 Week               Test Results Pending at Discharge  Pending Labs     Order Current Status    Tissue Pathology Exam In process           Rodrigo Castro Jr., MD  09/11/21  13:15 EDT    Time: 30 min

## 2021-09-13 ENCOUNTER — TRANSITIONAL CARE MANAGEMENT TELEPHONE ENCOUNTER (OUTPATIENT)
Dept: CALL CENTER | Facility: HOSPITAL | Age: 61
End: 2021-09-13

## 2021-09-13 LAB
CYTO UR: NORMAL
LAB AP CASE REPORT: NORMAL
LAB AP DIAGNOSIS COMMENT: NORMAL
LAB AP SPECIAL STAINS: NORMAL
Lab: NORMAL
PATH REPORT.FINAL DX SPEC: NORMAL
PATH REPORT.GROSS SPEC: NORMAL

## 2021-09-13 NOTE — OUTREACH NOTE
Call Center TCM Note      Responses   [facility] patient discharged from?  [place]   Does the patient have one of the following disease processes/diagnoses(primary or secondary)?  General Surgery   TCM attempt successful?  No   Unsuccessful attempts  Attempt 2          Gabby Sánchez RN    9/13/2021, 16:21 EDT

## 2021-09-13 NOTE — OUTREACH NOTE
Call Center TCM Note      Responses   [facility] patient discharged from?  [place]   Does the patient have one of the following disease processes/diagnoses(primary or secondary)?  General Surgery   TCM attempt successful?  No   Unsuccessful attempts  Attempt 1          Gabby Sánchez RN    9/13/2021, 15:53 EDT

## 2021-09-13 NOTE — CASE MANAGEMENT/SOCIAL WORK
Case Management Discharge Note      Final Note: D/c home w/ family via private car.         Selected Continued Care - Discharged on 9/11/2021 Admission date: 9/9/2021 - Discharge disposition: Home or Self Care    Destination    No services have been selected for the patient.              Durable Medical Equipment    No services have been selected for the patient.              Dialysis/Infusion    No services have been selected for the patient.              Home Medical Care    No services have been selected for the patient.              Therapy    No services have been selected for the patient.              Community Resources    No services have been selected for the patient.              Community & DME    No services have been selected for the patient.                  Transportation Services  Private: Car    Final Discharge Disposition Code: 01 - home or self-care

## 2021-09-14 ENCOUNTER — TRANSITIONAL CARE MANAGEMENT TELEPHONE ENCOUNTER (OUTPATIENT)
Dept: CALL CENTER | Facility: HOSPITAL | Age: 61
End: 2021-09-14

## 2021-09-14 NOTE — OUTREACH NOTE
Call Center TCM Note      Responses   [facility] patient discharged from?  [place]   Does the patient have one of the following disease processes/diagnoses(primary or secondary)?  General Surgery   TCM attempt successful?  No   Unsuccessful attempts  Attempt 3   Wrap up additional comments  Unable to reach pt x 3 attempts for TCM call. Pt is not currently sched for tcm fwp with PCP Dr Wade Booth MA    9/14/2021, 16:27 EDT

## 2022-05-09 ENCOUNTER — LAB (OUTPATIENT)
Dept: LAB | Facility: HOSPITAL | Age: 62
End: 2022-05-09

## 2022-05-09 ENCOUNTER — OFFICE VISIT (OUTPATIENT)
Dept: INTERNAL MEDICINE | Facility: CLINIC | Age: 62
End: 2022-05-09

## 2022-05-09 VITALS
RESPIRATION RATE: 18 BRPM | HEART RATE: 81 BPM | BODY MASS INDEX: 30.49 KG/M2 | DIASTOLIC BLOOD PRESSURE: 78 MMHG | OXYGEN SATURATION: 97 % | WEIGHT: 201.2 LBS | HEIGHT: 68 IN | SYSTOLIC BLOOD PRESSURE: 120 MMHG

## 2022-05-09 DIAGNOSIS — J45.20 MILD INTERMITTENT ASTHMA WITHOUT COMPLICATION: Chronic | ICD-10-CM

## 2022-05-09 DIAGNOSIS — K21.00 GASTROESOPHAGEAL REFLUX DISEASE WITH ESOPHAGITIS WITHOUT HEMORRHAGE: Chronic | ICD-10-CM

## 2022-05-09 DIAGNOSIS — D30.02 RENAL ONCOCYTOMA, LEFT: ICD-10-CM

## 2022-05-09 DIAGNOSIS — Z00.00 ROUTINE PHYSICAL EXAMINATION: Primary | ICD-10-CM

## 2022-05-09 DIAGNOSIS — E66.9 NON MORBID OBESITY: ICD-10-CM

## 2022-05-09 DIAGNOSIS — G43.709 CHRONIC MIGRAINE W/O AURA W/O STATUS MIGRAINOSUS, NOT INTRACTABLE: ICD-10-CM

## 2022-05-09 DIAGNOSIS — Z91.09 MULTIPLE ENVIRONMENTAL ALLERGIES: Chronic | ICD-10-CM

## 2022-05-09 DIAGNOSIS — R73.01 IMPAIRED FASTING GLUCOSE: Chronic | ICD-10-CM

## 2022-05-09 DIAGNOSIS — Z51.81 THERAPEUTIC DRUG MONITORING: ICD-10-CM

## 2022-05-09 DIAGNOSIS — E55.9 VITAMIN D DEFICIENCY: Chronic | ICD-10-CM

## 2022-05-09 DIAGNOSIS — E78.5 DYSLIPIDEMIA: Chronic | ICD-10-CM

## 2022-05-09 DIAGNOSIS — Z23 NEED FOR PNEUMOCOCCAL VACCINATION: ICD-10-CM

## 2022-05-09 DIAGNOSIS — Z86.16 HISTORY OF COVID-19: Chronic | ICD-10-CM

## 2022-05-09 PROBLEM — R11.2 NAUSEA & VOMITING: Status: RESOLVED | Noted: 2019-10-03 | Resolved: 2022-05-09

## 2022-05-09 PROBLEM — R13.10 DYSPHAGIA: Status: RESOLVED | Noted: 2019-10-03 | Resolved: 2022-05-09

## 2022-05-09 PROBLEM — G47.33 OBSTRUCTIVE SLEEP APNEA: Status: ACTIVE | Noted: 2022-05-09

## 2022-05-09 PROBLEM — G47.33 OBSTRUCTIVE SLEEP APNEA: Chronic | Status: ACTIVE | Noted: 2022-05-09

## 2022-05-09 PROBLEM — Z12.11 SCREEN FOR COLON CANCER: Status: RESOLVED | Noted: 2019-10-03 | Resolved: 2022-05-09

## 2022-05-09 PROBLEM — N28.89 LEFT RENAL MASS: Status: RESOLVED | Noted: 2021-09-09 | Resolved: 2022-05-09

## 2022-05-09 LAB
25(OH)D3 SERPL-MCNC: 47.1 NG/ML (ref 30–100)
ALBUMIN SERPL-MCNC: 4.9 G/DL (ref 3.5–5.2)
ALBUMIN/GLOB SERPL: 2.3 G/DL
ALP SERPL-CCNC: 53 U/L (ref 39–117)
ALT SERPL W P-5'-P-CCNC: 16 U/L (ref 1–41)
ANION GAP SERPL CALCULATED.3IONS-SCNC: 12 MMOL/L (ref 5–15)
AST SERPL-CCNC: 22 U/L (ref 1–40)
BILIRUB SERPL-MCNC: 0.3 MG/DL (ref 0–1.2)
BILIRUB UR QL STRIP: NEGATIVE
BUN SERPL-MCNC: 17 MG/DL (ref 8–23)
BUN/CREAT SERPL: 9.1 (ref 7–25)
CALCIUM SPEC-SCNC: 9.6 MG/DL (ref 8.6–10.5)
CHLORIDE SERPL-SCNC: 105 MMOL/L (ref 98–107)
CLARITY UR: CLEAR
CO2 SERPL-SCNC: 23 MMOL/L (ref 22–29)
COLOR UR: YELLOW
CREAT SERPL-MCNC: 1.87 MG/DL (ref 0.76–1.27)
DEPRECATED RDW RBC AUTO: 43 FL (ref 37–54)
EGFRCR SERPLBLD CKD-EPI 2021: 39.9 ML/MIN/1.73
ERYTHROCYTE [DISTWIDTH] IN BLOOD BY AUTOMATED COUNT: 13.1 % (ref 12.3–15.4)
GLOBULIN UR ELPH-MCNC: 2.1 GM/DL
GLUCOSE SERPL-MCNC: 105 MG/DL (ref 65–99)
GLUCOSE UR STRIP-MCNC: NEGATIVE MG/DL
HBA1C MFR BLD: 4.9 % (ref 4.8–5.6)
HCT VFR BLD AUTO: 39.9 % (ref 37.5–51)
HGB BLD-MCNC: 13.4 G/DL (ref 13–17.7)
HGB UR QL STRIP.AUTO: NEGATIVE
KETONES UR QL STRIP: NEGATIVE
LEUKOCYTE ESTERASE UR QL STRIP.AUTO: NEGATIVE
MCH RBC QN AUTO: 29.8 PG (ref 26.6–33)
MCHC RBC AUTO-ENTMCNC: 33.6 G/DL (ref 31.5–35.7)
MCV RBC AUTO: 88.9 FL (ref 79–97)
NITRITE UR QL STRIP: NEGATIVE
PH UR STRIP.AUTO: 6 [PH] (ref 5–8)
PLATELET # BLD AUTO: 217 10*3/MM3 (ref 140–450)
PMV BLD AUTO: 11.7 FL (ref 6–12)
POTASSIUM SERPL-SCNC: 4.5 MMOL/L (ref 3.5–5.2)
PROT SERPL-MCNC: 7 G/DL (ref 6–8.5)
PROT UR QL STRIP: ABNORMAL
PSA SERPL-MCNC: 1.07 NG/ML (ref 0–4)
RBC # BLD AUTO: 4.49 10*6/MM3 (ref 4.14–5.8)
SODIUM SERPL-SCNC: 140 MMOL/L (ref 136–145)
SP GR UR STRIP: 1.02 (ref 1–1.03)
T3FREE SERPL-MCNC: 3.34 PG/ML (ref 2–4.4)
T4 FREE SERPL-MCNC: 1.57 NG/DL (ref 0.93–1.7)
TSH SERPL DL<=0.05 MIU/L-ACNC: 2.25 UIU/ML (ref 0.27–4.2)
UROBILINOGEN UR QL STRIP: ABNORMAL
WBC NRBC COR # BLD: 5.27 10*3/MM3 (ref 3.4–10.8)

## 2022-05-09 PROCEDURE — 80050 GENERAL HEALTH PANEL: CPT | Performed by: INTERNAL MEDICINE

## 2022-05-09 PROCEDURE — 82306 VITAMIN D 25 HYDROXY: CPT | Performed by: INTERNAL MEDICINE

## 2022-05-09 PROCEDURE — 83704 LIPOPROTEIN BLD QUAN PART: CPT | Performed by: INTERNAL MEDICINE

## 2022-05-09 PROCEDURE — 99386 PREV VISIT NEW AGE 40-64: CPT | Performed by: INTERNAL MEDICINE

## 2022-05-09 PROCEDURE — 84439 ASSAY OF FREE THYROXINE: CPT | Performed by: INTERNAL MEDICINE

## 2022-05-09 PROCEDURE — 80061 LIPID PANEL: CPT | Performed by: INTERNAL MEDICINE

## 2022-05-09 PROCEDURE — 90471 IMMUNIZATION ADMIN: CPT | Performed by: INTERNAL MEDICINE

## 2022-05-09 PROCEDURE — 81003 URINALYSIS AUTO W/O SCOPE: CPT | Performed by: INTERNAL MEDICINE

## 2022-05-09 PROCEDURE — 84153 ASSAY OF PSA TOTAL: CPT | Performed by: INTERNAL MEDICINE

## 2022-05-09 PROCEDURE — 84481 FREE ASSAY (FT-3): CPT | Performed by: INTERNAL MEDICINE

## 2022-05-09 PROCEDURE — 90732 PPSV23 VACC 2 YRS+ SUBQ/IM: CPT | Performed by: INTERNAL MEDICINE

## 2022-05-09 PROCEDURE — 36415 COLL VENOUS BLD VENIPUNCTURE: CPT | Performed by: INTERNAL MEDICINE

## 2022-05-09 PROCEDURE — 83036 HEMOGLOBIN GLYCOSYLATED A1C: CPT | Performed by: INTERNAL MEDICINE

## 2022-05-09 NOTE — PROGRESS NOTES
05/09/2022    Patient Information  Tito Jackson                                                                                          [address]  [facility] [address]      1960  [unfilled]  There is no work phone number on file.    Chief Complaint:     Routine annual physical examination/preventative visit.  New patient appointment.  Patient has not been seen in over 3 years.    History of Present Illness:    Patient with impaired fasting glucose, dyslipidemia, esophageal reflux, migraine headaches, environmental allergies and stable mild intermittent asthma, vitamin D deficiency, history of COVID-19 infection, history of laparoscopic cholecystectomy for gangrenous gallbladder last year, status post resection of left renal mass last year and pathology returned renal oncocytoma.  He presents today for his routine annual physical exam and follow-up lab work.  I have not seen this patient in follow-up for any of the problems from last year.  As a matter fact I have not seen him since April 2019, and technically patient is a new patient for insurance purposes..  Past medical history reviewed and updated were necessary including health maintenance parameters.  This reveals he needs pneumococcal vaccine.    Review of Systems   Constitutional: Negative.   HENT: Negative.    Eyes: Negative.    Cardiovascular: Negative.    Respiratory: Negative.    Endocrine: Negative.    Hematologic/Lymphatic: Negative.    Skin: Negative.    Musculoskeletal: Negative.    Gastrointestinal: Negative.    Genitourinary: Negative.    Neurological: Negative.    Psychiatric/Behavioral: Negative.    Allergic/Immunologic: Negative.        Active Problems:    Patient Active Problem List   Diagnosis   • Allergic rhinitis   • Diverticulosis of colon   • Dyslipidemia   • Gastroesophageal reflux disease with esophagitis   • Impaired fasting glucose   • Chronic migraine w/o aura w/o status migrainosus, not intractable   • Mild  intermittent asthma   • Multiple environmental allergies   • Vitamin D deficiency   • Therapeutic drug monitoring   • Routine physical examination   • Renal oncocytoma, left, 9/11/2021--laparoscopic left partial nephrectomy.   • History of COVID-19   • Non morbid obesity   • Obstructive sleep apnea, not compliant with CPAP         Past Medical History:   Diagnosis Date   • Allergic rhinitis 02/23/2016    Patient had remote allergy testing as a child but never received immunotherapy except shots for poison ivy.   • Chronic migraine w/o aura w/o status migrainosus, not intractable 02/23/2016   • Diverticulosis of colon 02/27/2009 02/27/2009--colonoscopy reveals multiple large and small mouth diverticula in sigmoid colon. The rest of the colon was normal.   • Dyslipidemia 08/20/2015 08/20/2015--NMR reveals a total cholesterol 140. Triglycerides elevated at 203. LDL particle number excellent at 965. Small LDL particle number elevated at 747. HDL particle number low at 26.0.   • Gastroesophageal reflux disease with esophagitis 02/27/2009 02/27/2009--EGD revealed LA grade a esophagitis with no evidence of bleeding. Biopsies taken. A small hiatal hernia was present. Diffuse mildly erythematous mucosa with no bleeding was found in the stomach. Biopsies taken. Biopsy sent for H. pylori. No gross lesions were noted in the entire duodenum. Random gastric biopsies returned fragments of unremarkablle gastric mucosa. Hyperplastic polypoid gastric mucosa, fundic type. H pylori negative. Distal esophagitis biopsies revealed focus of acute inflammation with evidence of mucosal erosion/ulcer. Strips of squamous epithelium with focal parakeratosis. Special stains for fungi were negative.   • History of COVID-19 05/09/2022 October 2020--patient tested positive for COVID.  Mild symptoms of sore throat and headache.  No loss of taste or smell.  No residual.   • History of herpes genitalis Approximately 2001    Approximately  2001--patient had an initial outbreak of genital herpes and has not had a recurrent since.   • History of pneumonia 2002 08/29/2002--bronchoscopy was performed with brushings and biopsies which were negative for malignant cells. Watching showed only normal respiratory breana. AFB and fungal smears negative. The thoracic surgeon wanted to do a VATS procedure and patient refused. His adenopathy as well as infiltrate subsequently resolved. No further recurrence.  08/09/2002--CT scan of the chest performed for abnormal c   • Impaired fasting glucose 08/20/2015 08/20/2015--serum glucose 109. Recommend diet, weight loss, exercise.   • Migraine headaches 02/23/2016   • Mild intermittent asthma 02/23/2016   • Multiple environmental allergies 02/23/2016    Patient had remote allergy testing as a child but never received immunotherapy except shots for poison ivy.   • Non morbid obesity 05/09/2022   • Obstructive sleep apnea, not compliant with CPAP 5/9/2022    Patient not compliant with CPAP.   • Vitamin D deficiency 02/23/2016         Past Surgical History:   Procedure Laterality Date   • APPENDECTOMY  Childhood    As a child   • BRONCHOSCOPY  08/29/2002 08/29/2002--bronchoscopy was performed with brushings and biopsies which were negative for malignant cells. Watching showed only normal respiratory breana. AFB and fungal smears negative. The thoracic surgeon wanted to do a VATS procedure and patient refused. His adenopathy as well as infiltrate subsequently resolved. No further recurrence.  08/09/2002--CT scan of the chest performed for abnormal c   • CHOLECYSTECTOMY  07/29/2021    [facility]   • COLONOSCOPY  02/27/2009 02/27/2009--colonoscopy reveals multiple large and small mouth diverticula in sigmoid colon. The rest of the colon was normal.   • COLONOSCOPY N/A 12/06/2019    Procedure: COLONOSCOPY into cecum and TI with cold biopsy polypectomies;  Surgeon: Marques Gonzalez MD;  Location: [facility] ENDOSCOPY;  Service:  Gastroenterology   • DACRYOCYSTORHINOSTOMY Right 07/12/2012 07/12/2012--dacryocystorhinostomy of the right eye with silicone intubation of lacrimal drainage system. Patient's symptoms have totally resolved   • ENDOSCOPY N/A 12/06/2019    Procedure: ESOPHAGOGASTRODUODENOSCOPY with biopsies and dilation of esophageal stricture using 15-18 balloon;  Surgeon: Marques Gonzalez MD;  Location: [facility] ENDOSCOPY;  Service: Gastroenterology   • ESOPHAGOSCOPY / EGD  02/27/2009 02/27/2009--EGD revealed LA grade a esophagitis with no evidence of bleeding. Biopsies taken. A small hiatal hernia was present. Diffuse mildly erythematous mucosa with no bleeding was found in the stomach. Biopsies taken. Biopsy sent for H. pylori. No gross lesions were noted in the entire duodenum. Random gastric biopsies returned fragments of unremarkablle gastric mucosa. Hyperplastic polypoid    • LAPAROSCOPIC CHOLECYSTECTOMY  07/29/2021 July 29, 2021--laparoscopic cholecystectomy with cholangiogram.   • NEPHRECTOMY PARTIAL Left 09/09/2021    Procedure: NEPHRECTOMY PARTIAL LAPAROSCOPIC WITH DAVINCI ROBOT;  Surgeon: Jarad Abrams Jr., MD;  Location: [facility] MAIN OR;  Service: Robotics - DaVinci;  Laterality: Left;   • SKIN SURGERY  12/15/2008    12/15/2008--patient had a total of 7 lesions removed from his face and clavicle. Pathology returned seborrheic keratoses inflamed on the clavicle.   • TONSILLECTOMY  Childhood    As a child         No Known Allergies        Current Outpatient Medications:   •  albuterol (PROVENTIL HFA;VENTOLIN HFA) 108 (90 BASE) MCG/ACT inhaler, Proventil  (90 Base) MCG/ACT Inhalation Aerosol Solution; Patient Sig: Proventil  (90 Base) MCG/ACT Inhalation Aerosol Solution 1-2 puffs every 4 hours when necessary asthma; 1; 5; 23-Jan-2014; Active, Disp: , Rfl:   •  omeprazole (priLOSEC) 40 MG capsule, Take 1 capsule by mouth Daily., Disp: 90 capsule, Rfl: 1    Current Facility-Administered Medications:  "  •  pneumococcal polysaccharide 23-valent (PNEUMOVAX-23) vaccine 0.5 mL, 0.5 mL, Subcutaneous, Once, Wade Butcher MD      Family History   Problem Relation Age of Onset   • Diabetes Father         Type 2   • Malig Hyperthermia Neg Hx          Social History     Socioeconomic History   • Marital status:      Spouse name: adan   • Highest education level: Master's degree (e.g., MA, MS, Janette, MEd, MSW, CAROL)   Tobacco Use   • Smoking status: Never Smoker   • Smokeless tobacco: Never Used   Vaping Use   • Vaping Use: Never used   Substance and Sexual Activity   • Alcohol use: No   • Drug use: No   • Sexual activity: Defer         Vitals:    05/09/22 0754   BP: 120/78   Pulse: 81   Resp: 18   SpO2: 97%   Weight: 91.3 kg (201 lb 3.2 oz)   Height: 172.7 cm (68\")        Body mass index is 30.59 kg/m².      Physical Exam:    General: Alert and oriented x 3.  No acute distress.  Obese.  Normal affect.  HEENT: Pupils equal, round, reactive to light; extraocular movements intact; sclerae nonicteric; pharynx, ear canals and TMs normal.  Neck: Without JVD, thyromegaly, bruit, or adenopathy.  Lungs: Clear to auscultation in all fields.  Heart: Regular rate and rhythm without murmur, rub, gallop, or click.  Abdomen: Soft, nontender, without hepatosplenomegaly or hernia.  Bowel sounds normal.  : Deferred.  Rectal: Deferred.  Extremities: Without clubbing, cyanosis, edema, or pulse deficit.  Neurologic: Intact without focal deficit.  Normal station and gait observed during ingress and egress from the examination room.  Skin: Without significant lesion.  Musculoskeletal: Unremarkable.    Lab/other results:    Lab work was not performed prior to this visit.    Assessment/Plan:     Diagnosis Plan   1. Routine physical examination  CBC (No Diff)    Comprehensive Metabolic Panel    Hemoglobin A1c    NMR LipoProfile    Vitamin D 25 Hydroxy    Urinalysis With Microscopic If Indicated (No Culture) - Urine, Clean Catch    TSH "    T4, Free    T3, Free    PSA DIAGNOSTIC    CBC (No Diff)    Comprehensive Metabolic Panel    Hemoglobin A1c    NMR LipoProfile    Vitamin D 25 Hydroxy    Urinalysis With Microscopic If Indicated (No Culture) - Urine, Clean Catch    TSH    T4, Free    T3, Free    PSA DIAGNOSTIC   2. Renal oncocytoma, left, 9/11/2021--laparoscopic left partial nephrectomy.     3. Impaired fasting glucose  Comprehensive Metabolic Panel    Hemoglobin A1c    Comprehensive Metabolic Panel    Hemoglobin A1c   4. Dyslipidemia  NMR LipoProfile    TSH    T4, Free    T3, Free    NMR LipoProfile    TSH    T4, Free    T3, Free   5. Gastroesophageal reflux disease with esophagitis without hemorrhage     6. Chronic migraine w/o aura w/o status migrainosus, not intractable     7. Multiple environmental allergies     8. Mild intermittent asthma without complication     9. Vitamin D deficiency  Vitamin D 25 Hydroxy    Vitamin D 25 Hydroxy   10. History of COVID-19     11. Need for pneumococcal vaccination  pneumococcal polysaccharide 23-valent (PNEUMOVAX-23) vaccine 0.5 mL   12. Non morbid obesity     13. Therapeutic drug monitoring  CBC (No Diff)    Urinalysis With Microscopic If Indicated (No Culture) - Urine, Clean Catch    PSA DIAGNOSTIC    CBC (No Diff)    Urinalysis With Microscopic If Indicated (No Culture) - Urine, Clean Catch    PSA DIAGNOSTIC     Patient presents for his routine physical exam/preventative visit.  However, he did not have his lab work done prior to this visit at the direction of the hub .  This means that we cannot totally assess help today is much as I would have liked.  Patient also has been somewhat lost to follow-up and underwent some serious surgeries including laparoscopic cholecystectomy for gangrenous gallbladder as well as a partial left nephrectomy for a renal oncocytoma which is a benign lesion.  He has recovered nicely from the surgeries.  He has impaired fasting glucose and is at risk for diabetes  given his nonmorbid obesity.  I have strongly recommended low carbohydrate diet, exercise, and weight loss.  Esophageal reflux symptoms controlled with omeprazole.  His blood pressure appears to be controlled without any medication.  Migraines are stable.  His asthma and allergies are stable.  Vitamin D needs to be assessed with lab work.  It appears he is not taking any vitamin D supplementation.    Several preventative health issues discussed including review of vaccinations and recommendations, including dietary issues, exercise and weight loss.  Safe sex practices discussed.  Patient advised to wear seatbelt whenever driving and avoid texting and driving.  Also advised to look both ways before crossing the street.  He is up-to-date with colon cancer screening.  Advised to avoid tobacco products and minimize alcohol consumption.    Plan is as follows: I will have patient get fasting lab work and we can follow-up on phone with results and possible further instructions.  Recommend PPSV23.  Recommend low-carb diet and exercise as well as weight loss.  Patient will follow-up in 1 year for his annual physical or follow-up as needed.    Procedures

## 2022-05-11 LAB
CHOLEST SERPL-MCNC: 151 MG/DL (ref 100–199)
HDL SERPL-SCNC: 28.9 UMOL/L
HDLC SERPL-MCNC: 35 MG/DL
LDL SERPL QN: 20.5 NM
LDL SERPL-SCNC: 887 NMOL/L
LDL SMALL SERPL-SCNC: 408 NMOL/L
LDLC SERPL CALC-MCNC: 91 MG/DL (ref 0–99)
TRIGL SERPL-MCNC: 143 MG/DL (ref 0–149)

## 2024-05-23 ENCOUNTER — OFFICE VISIT (OUTPATIENT)
Dept: GASTROENTEROLOGY | Facility: CLINIC | Age: 64
End: 2024-05-23
Payer: COMMERCIAL

## 2024-05-23 VITALS
WEIGHT: 226 LBS | HEIGHT: 68 IN | BODY MASS INDEX: 34.25 KG/M2 | TEMPERATURE: 98 F | SYSTOLIC BLOOD PRESSURE: 129 MMHG | HEART RATE: 84 BPM | DIASTOLIC BLOOD PRESSURE: 74 MMHG

## 2024-05-23 DIAGNOSIS — R13.10 DYSPHAGIA, UNSPECIFIED TYPE: ICD-10-CM

## 2024-05-23 DIAGNOSIS — D12.6 ADENOMATOUS POLYP OF COLON, UNSPECIFIED PART OF COLON: ICD-10-CM

## 2024-05-23 DIAGNOSIS — K21.01 GASTROESOPHAGEAL REFLUX DISEASE WITH ESOPHAGITIS AND HEMORRHAGE: Primary | Chronic | ICD-10-CM

## 2024-05-23 LAB
ALBUMIN SERPL-MCNC: 4.8 G/DL (ref 3.5–5.2)
ALBUMIN/GLOB SERPL: 2.2 G/DL
ALP SERPL-CCNC: 70 U/L (ref 39–117)
ALT SERPL-CCNC: 27 U/L (ref 1–41)
AST SERPL-CCNC: 20 U/L (ref 1–40)
BASOPHILS # BLD AUTO: 0.08 10*3/MM3 (ref 0–0.2)
BASOPHILS NFR BLD AUTO: 1 % (ref 0–1.5)
BILIRUB SERPL-MCNC: 0.3 MG/DL (ref 0–1.2)
BUN SERPL-MCNC: 24 MG/DL (ref 8–23)
BUN/CREAT SERPL: 14.2 (ref 7–25)
CALCIUM SERPL-MCNC: 9.5 MG/DL (ref 8.6–10.5)
CHLORIDE SERPL-SCNC: 108 MMOL/L (ref 98–107)
CO2 SERPL-SCNC: 22.7 MMOL/L (ref 22–29)
CREAT SERPL-MCNC: 1.69 MG/DL (ref 0.76–1.27)
EGFRCR SERPLBLD CKD-EPI 2021: 44.5 ML/MIN/1.73
EOSINOPHIL # BLD AUTO: 0.29 10*3/MM3 (ref 0–0.4)
EOSINOPHIL NFR BLD AUTO: 3.7 % (ref 0.3–6.2)
ERYTHROCYTE [DISTWIDTH] IN BLOOD BY AUTOMATED COUNT: 13.3 % (ref 12.3–15.4)
GLOBULIN SER CALC-MCNC: 2.2 GM/DL
GLUCOSE SERPL-MCNC: 95 MG/DL (ref 65–99)
HCT VFR BLD AUTO: 42.4 % (ref 37.5–51)
HGB BLD-MCNC: 14.3 G/DL (ref 13–17.7)
IMM GRANULOCYTES # BLD AUTO: 0.11 10*3/MM3 (ref 0–0.05)
IMM GRANULOCYTES NFR BLD AUTO: 1.4 % (ref 0–0.5)
LYMPHOCYTES # BLD AUTO: 1.26 10*3/MM3 (ref 0.7–3.1)
LYMPHOCYTES NFR BLD AUTO: 16 % (ref 19.6–45.3)
MCH RBC QN AUTO: 29.2 PG (ref 26.6–33)
MCHC RBC AUTO-ENTMCNC: 33.7 G/DL (ref 31.5–35.7)
MCV RBC AUTO: 86.5 FL (ref 79–97)
MONOCYTES # BLD AUTO: 0.76 10*3/MM3 (ref 0.1–0.9)
MONOCYTES NFR BLD AUTO: 9.6 % (ref 5–12)
NEUTROPHILS # BLD AUTO: 5.38 10*3/MM3 (ref 1.7–7)
NEUTROPHILS NFR BLD AUTO: 68.3 % (ref 42.7–76)
NRBC BLD AUTO-RTO: 0 /100 WBC (ref 0–0.2)
PLATELET # BLD AUTO: 241 10*3/MM3 (ref 140–450)
POTASSIUM SERPL-SCNC: 4.6 MMOL/L (ref 3.5–5.2)
PROT SERPL-MCNC: 7 G/DL (ref 6–8.5)
RBC # BLD AUTO: 4.9 10*6/MM3 (ref 4.14–5.8)
SODIUM SERPL-SCNC: 144 MMOL/L (ref 136–145)
WBC # BLD AUTO: 7.88 10*3/MM3 (ref 3.4–10.8)

## 2024-05-23 RX ORDER — DEXLANSOPRAZOLE 60 MG/1
1 CAPSULE, DELAYED RELEASE ORAL DAILY
COMMUNITY
Start: 2024-04-12

## 2024-05-23 RX ORDER — FLUTICASONE PROPIONATE 50 MCG
SPRAY, SUSPENSION (ML) NASAL
COMMUNITY

## 2024-05-23 NOTE — PROGRESS NOTES
Chief Complaint   Patient presents with    Heartburn    Difficulty Swallowing       History of Present Illness:   63 y.o. male whose wife is a pediatrician. He had an EGD (with esophageal dilation) and colonoscopy in 12/19.  I recommended a repeat colonoscopy in 3 years because of his history of colon polyps.       C/o solids dysphagia. No chest or abdominal pain. NO nausea or vomiting. No fevers, chills. He is  of a hospital in [place]. NO diarrhea or constiaption. No rectal bleeding or melena.            Past Medical History:   Diagnosis Date    Allergic rhinitis 02/23/2016    Patient had remote allergy testing as a child but never received immunotherapy except shots for poison ivy.    Chronic migraine w/o aura w/o status migrainosus, not intractable 02/23/2016    Diverticulosis of colon 02/27/2009 02/27/2009--colonoscopy reveals multiple large and small mouth diverticula in sigmoid colon. The rest of the colon was normal.    Dyslipidemia 08/20/2015 08/20/2015--NMR reveals a total cholesterol 140. Triglycerides elevated at 203. LDL particle number excellent at 965. Small LDL particle number elevated at 747. HDL particle number low at 26.0.    Gastroesophageal reflux disease with esophagitis 02/27/2009 02/27/2009--EGD revealed LA grade a esophagitis with no evidence of bleeding. Biopsies taken. A small hiatal hernia was present. Diffuse mildly erythematous mucosa with no bleeding was found in the stomach. Biopsies taken. Biopsy sent for H. pylori. No gross lesions were noted in the entire duodenum. Random gastric biopsies returned fragments of unremarkablle gastric mucosa. Hyperplastic polypoid gastric mucosa, fundic type. H pylori negative. Distal esophagitis biopsies revealed focus of acute inflammation with evidence of mucosal erosion/ulcer. Strips of squamous epithelium with focal parakeratosis. Special stains for fungi were negative.    History of COVID-19 05/09/2022 October 2020--patient  tested positive for COVID.  Mild symptoms of sore throat and headache.  No loss of taste or smell.  No residual.    History of herpes genitalis Approximately 2001    Approximately 2001--patient had an initial outbreak of genital herpes and has not had a recurrent since.    History of pneumonia 2002 08/29/2002--bronchoscopy was performed with brushings and biopsies which were negative for malignant cells. Watching showed only normal respiratory breana. AFB and fungal smears negative. The thoracic surgeon wanted to do a VATS procedure and patient refused. His adenopathy as well as infiltrate subsequently resolved. No further recurrence.  08/09/2002--CT scan of the chest performed for abnormal c    Impaired fasting glucose 08/20/2015 08/20/2015--serum glucose 109. Recommend diet, weight loss, exercise.    Migraine headaches 02/23/2016    Mild intermittent asthma 02/23/2016    Multiple environmental allergies 02/23/2016    Patient had remote allergy testing as a child but never received immunotherapy except shots for poison ivy.    Non morbid obesity 05/09/2022    Obstructive sleep apnea, not compliant with CPAP 5/9/2022    Patient not compliant with CPAP.    Vitamin D deficiency 02/23/2016       Past Surgical History:   Procedure Laterality Date    APPENDECTOMY  Childhood    As a child    BRONCHOSCOPY  08/29/2002 08/29/2002--bronchoscopy was performed with brushings and biopsies which were negative for malignant cells. Watching showed only normal respiratory breana. AFB and fungal smears negative. The thoracic surgeon wanted to do a VATS procedure and patient refused. His adenopathy as well as infiltrate subsequently resolved. No further recurrence.  08/09/2002--CT scan of the chest performed for abnormal c    CHOLECYSTECTOMY  07/29/2021    [facility]    COLONOSCOPY  02/27/2009 02/27/2009--colonoscopy reveals multiple large and small mouth diverticula in sigmoid colon. The rest of the colon was normal.     COLONOSCOPY N/A 12/06/2019    Procedure: COLONOSCOPY into cecum and TI with cold biopsy polypectomies;  Surgeon: Marques Gonzalez MD;  Location: [facility] ENDOSCOPY;  Service: Gastroenterology    DACRYOCYSTORHINOSTOMY Right 07/12/2012 07/12/2012--dacryocystorhinostomy of the right eye with silicone intubation of lacrimal drainage system. Patient's symptoms have totally resolved    ENDOSCOPY N/A 12/06/2019    Procedure: ESOPHAGOGASTRODUODENOSCOPY with biopsies and dilation of esophageal stricture using 15-18 balloon;  Surgeon: Marques Gonzalez MD;  Location: [facility] ENDOSCOPY;  Service: Gastroenterology    ESOPHAGOSCOPY / EGD  02/27/2009 02/27/2009--EGD revealed LA grade a esophagitis with no evidence of bleeding. Biopsies taken. A small hiatal hernia was present. Diffuse mildly erythematous mucosa with no bleeding was found in the stomach. Biopsies taken. Biopsy sent for H. pylori. No gross lesions were noted in the entire duodenum. Random gastric biopsies returned fragments of unremarkablle gastric mucosa. Hyperplastic polypoid     LAPAROSCOPIC CHOLECYSTECTOMY  07/29/2021 July 29, 2021--laparoscopic cholecystectomy with cholangiogram.    NEPHRECTOMY PARTIAL Left 09/09/2021    Procedure: NEPHRECTOMY PARTIAL LAPAROSCOPIC WITH DAVINCI ROBOT;  Surgeon: Jarad Abrams Jr., MD;  Location: [facility] MAIN OR;  Service: Robotics - DaVinci;  Laterality: Left;    SKIN SURGERY  12/15/2008    12/15/2008--patient had a total of 7 lesions removed from his face and clavicle. Pathology returned seborrheic keratoses inflamed on the clavicle.    TONSILLECTOMY  Childhood    As a child         Current Outpatient Medications:     albuterol (PROVENTIL HFA;VENTOLIN HFA) 108 (90 BASE) MCG/ACT inhaler, Proventil  (90 Base) MCG/ACT Inhalation Aerosol Solution; Patient Sig: Proventil  (90 Base) MCG/ACT Inhalation Aerosol Solution 1-2 puffs every 4 hours when necessary asthma; 1; 5; 23-Jan-2014; Active, Disp: , Rfl:      dexlansoprazole (DEXILANT) 60 MG capsule, Take 1 capsule by mouth Daily., Disp: , Rfl:     fluticasone (FLONASE) 50 MCG/ACT nasal spray, INSTILL 1 SPRAY IN EACH NOSTRIL EVERY DAY, Disp: , Rfl:     omeprazole (priLOSEC) 40 MG capsule, Take 1 capsule by mouth Daily. (Patient not taking: Reported on 5/23/2024), Disp: 90 capsule, Rfl: 1    No Known Allergies    Family History   Problem Relation Age of Onset    Diabetes Father         Type 2    Malig Hyperthermia Neg Hx        Social History     Socioeconomic History    Marital status:      Spouse name: adan    Highest education level: Master's degree (e.g., MA, MS, Janette, MEd, MSW, CAROL)   Tobacco Use    Smoking status: Never    Smokeless tobacco: Never   Vaping Use    Vaping status: Never Used   Substance and Sexual Activity    Alcohol use: No    Drug use: No    Sexual activity: Defer       Review of Systems   Gastrointestinal:  Negative for abdominal pain.   All other systems reviewed and are negative.    Pertinent positives and negatives documented in the HPI and all other systems reviewed and were found to be negative.  Vitals:    05/23/24 0827   BP: 129/74   Pulse: 84   Temp: 98 °F (36.7 °C)       Physical Exam  Vitals reviewed.   Constitutional:       General: He is not in acute distress.     Appearance: Normal appearance. He is well-developed. He is not diaphoretic.   HENT:      Head: Normocephalic and atraumatic. Hair is normal.      Right Ear: Hearing, tympanic membrane, ear canal and external ear normal.      Left Ear: Hearing, tympanic membrane, ear canal and external ear normal.      Nose: Nose normal. No nasal deformity.      Mouth/Throat:      Mouth: Mucous membranes are moist. No oral lesions.      Pharynx: Uvula midline. No uvula swelling.   Eyes:      General: Lids are normal. No scleral icterus.        Right eye: No discharge.         Left eye: No discharge.      Extraocular Movements: Extraocular movements intact.      Right eye: Normal  extraocular motion and no nystagmus.      Left eye: Normal extraocular motion and no nystagmus.      Conjunctiva/sclera: Conjunctivae normal.      Pupils: Pupils are equal, round, and reactive to light.   Neck:      Thyroid: No thyromegaly.      Vascular: No JVD.   Cardiovascular:      Rate and Rhythm: Normal rate and regular rhythm.      Pulses: Normal pulses.      Heart sounds: Normal heart sounds. No murmur heard.     No gallop.   Pulmonary:      Effort: Pulmonary effort is normal. No respiratory distress.      Breath sounds: Normal breath sounds. No wheezing or rales.   Chest:      Chest wall: No tenderness.   Abdominal:      General: Bowel sounds are normal. There is no distension.      Palpations: Abdomen is soft. There is no mass.      Tenderness: There is no abdominal tenderness. There is no guarding.      Hernia: No hernia is present.   Musculoskeletal:         General: No tenderness or deformity. Normal range of motion.      Cervical back: Normal range of motion and neck supple.   Lymphadenopathy:      Cervical: No cervical adenopathy.   Skin:     General: Skin is warm and dry.      Findings: No rash.   Neurological:      Mental Status: He is alert and oriented to person, place, and time.      Cranial Nerves: No cranial nerve deficit.      Motor: No abnormal muscle tone.      Coordination: Coordination normal.      Deep Tendon Reflexes: Reflexes are normal and symmetric. Reflexes normal.   Psychiatric:         Mood and Affect: Mood normal.         Behavior: Behavior normal.         Thought Content: Thought content normal.         Judgment: Judgment normal.         Diagnoses and all orders for this visit:    1. Gastroesophageal reflux disease with esophagitis and hemorrhage (Primary)  -     Case Request; Standing  -     Case Request  -     CBC & Differential  -     Comprehensive Metabolic Panel    2. Dysphagia, unspecified type  -     Case Request; Standing  -     Case Request  -     CBC & Differential  -      Comprehensive Metabolic Panel    3. Adenomatous polyp of colon, unspecified part of colon  -     Case Request; Standing  -     Case Request  -     CBC & Differential  -     Comprehensive Metabolic Panel    Other orders  -     Implement Anesthesia orders day of procedure.; Standing  -     Follow Anesthesia Guidelines / Protocol; Future  -     Verify bowel prep was successful; Standing  -     Give tap water enema if bowel prep was insufficient; Standing      Assessment:  History of colon polyps.  His last colonoscopy was in 12 of 2019.  At that time I recommended a repeat colonoscopy in 3 years.  Dysphagia      Recommendations:  EGD and colonoscopy  Labs    No follow-ups on file.    Marques Gonzalez MD  5/23/2024

## 2024-05-27 NOTE — PROGRESS NOTES
05/27/24       Tell him that his lab work shows that he had a fairly normal-looking CBC although the differential said that there were 1.4% immature granulocytes.  Looking at his old CBC 2 years ago he also had immature granulocytes then.  His kidney function (creatinine 1.69 with a BUN of 24) is mildly elevated but is fairly stable from what it was 2+ years ago. His liver function tests look normal.  I would recommend that he go see Dr. Wade Butcher about his abnormal differential on the CBC and because of his elevated creatinine.  He should make sure that he is not taking any nonsteroidal anti-inflammatory drugs such as ibuprofen, Motrin, Aleve, etc. since they can damage your kidneys.       Send a copy of this report to his PCP.  Kodak schmitt

## 2024-05-28 ENCOUNTER — TELEPHONE (OUTPATIENT)
Dept: GASTROENTEROLOGY | Facility: CLINIC | Age: 64
End: 2024-05-28
Payer: COMMERCIAL

## 2024-05-28 NOTE — TELEPHONE ENCOUNTER
Called pt and left  for pt to call back. Also called home number and number disconnected.       Results sent to pcp thru [facility].

## 2024-05-28 NOTE — TELEPHONE ENCOUNTER
Marques Gonzalez MD  P [facility] Clinical 1 Pool  05/27/24       Tell him that his lab work shows that he had a fairly normal-looking CBC although the differential said that there were 1.4% immature granulocytes.  Looking at his old CBC 2 years ago he also had immature granulocytes then.  His kidney function (creatinine 1.69 with a BUN of 24) is mildly elevated but is fairly stable from what it was 2+ years ago. His liver function tests look normal.  I would recommend that he go see Dr. Wade Butcher about his abnormal differential on the CBC and because of his elevated creatinine.  He should make sure that he is not taking any nonsteroidal anti-inflammatory drugs such as ibuprofen, Motrin, Aleve, etc. since they can damage your kidneys.       Send a copy of this report to his PCP.  Kodak schmitt

## 2024-05-30 ENCOUNTER — TELEPHONE (OUTPATIENT)
Dept: GASTROENTEROLOGY | Facility: CLINIC | Age: 64
End: 2024-05-30
Payer: COMMERCIAL

## 2024-05-30 NOTE — TELEPHONE ENCOUNTER
CALLED TO SCHEDULE PT ,PT DELON TO WAIT 06/03/2024 TO SCHEDULE FOR 12/03/2024 FOR A MORNING ARRIVAL.OK FOR HUB TO RELAY

## 2024-06-03 ENCOUNTER — TELEPHONE (OUTPATIENT)
Dept: GASTROENTEROLOGY | Facility: CLINIC | Age: 64
End: 2024-06-03
Payer: COMMERCIAL

## 2024-06-04 ENCOUNTER — TELEPHONE (OUTPATIENT)
Dept: GASTROENTEROLOGY | Facility: CLINIC | Age: 64
End: 2024-06-04
Payer: COMMERCIAL

## 2024-06-04 PROBLEM — D12.6 ADENOMATOUS POLYP OF COLON: Status: ACTIVE | Noted: 2024-05-23

## 2024-06-04 PROBLEM — R13.10 DYSPHAGIA: Status: ACTIVE | Noted: 2024-05-23

## 2024-06-04 NOTE — TELEPHONE ENCOUNTER
DAVID LOCKHART IN SCHEDULING PT SCHEDULED 12/03/2024 ARRIVING AT 0800AM M[facility] /EGD  INFO SENT TO PT MY CHART OK FOR HUB TO RELAY

## 2024-12-03 ENCOUNTER — ANESTHESIA (OUTPATIENT)
Dept: GASTROENTEROLOGY | Facility: HOSPITAL | Age: 64
End: 2024-12-03
Payer: COMMERCIAL

## 2024-12-03 ENCOUNTER — HOSPITAL ENCOUNTER (OUTPATIENT)
Facility: HOSPITAL | Age: 64
Setting detail: HOSPITAL OUTPATIENT SURGERY
Discharge: HOME | End: 2024-12-03
Attending: INTERNAL MEDICINE | Admitting: INTERNAL MEDICINE
Payer: COMMERCIAL

## 2024-12-03 ENCOUNTER — ANESTHESIA EVENT (OUTPATIENT)
Dept: GASTROENTEROLOGY | Facility: HOSPITAL | Age: 64
End: 2024-12-03
Payer: COMMERCIAL

## 2024-12-03 VITALS
RESPIRATION RATE: 16 BRPM | HEART RATE: 87 BPM | OXYGEN SATURATION: 94 % | BODY MASS INDEX: 33.95 KG/M2 | WEIGHT: 224 LBS | SYSTOLIC BLOOD PRESSURE: 118 MMHG | DIASTOLIC BLOOD PRESSURE: 77 MMHG | HEIGHT: 68 IN

## 2024-12-03 DIAGNOSIS — D12.6 ADENOMATOUS POLYP OF COLON, UNSPECIFIED PART OF COLON: ICD-10-CM

## 2024-12-03 DIAGNOSIS — R13.10 DYSPHAGIA, UNSPECIFIED TYPE: ICD-10-CM

## 2024-12-03 DIAGNOSIS — K21.01 GASTROESOPHAGEAL REFLUX DISEASE WITH ESOPHAGITIS AND HEMORRHAGE: ICD-10-CM

## 2024-12-03 PROCEDURE — 43239 EGD BIOPSY SINGLE/MULTIPLE: CPT | Performed by: INTERNAL MEDICINE

## 2024-12-03 PROCEDURE — 25010000002 LIDOCAINE 2% SOLUTION: Performed by: NURSE ANESTHETIST, CERTIFIED REGISTERED

## 2024-12-03 PROCEDURE — 88305 TISSUE EXAM BY PATHOLOGIST: CPT | Performed by: INTERNAL MEDICINE

## 2024-12-03 PROCEDURE — 45385 COLONOSCOPY W/LESION REMOVAL: CPT | Performed by: INTERNAL MEDICINE

## 2024-12-03 PROCEDURE — 25010000002 GLYCOPYRROLATE 0.2 MG/ML SOLUTION: Performed by: NURSE ANESTHETIST, CERTIFIED REGISTERED

## 2024-12-03 PROCEDURE — 45380 COLONOSCOPY AND BIOPSY: CPT | Performed by: INTERNAL MEDICINE

## 2024-12-03 PROCEDURE — 43450 DILATE ESOPHAGUS 1/MULT PASS: CPT | Performed by: INTERNAL MEDICINE

## 2024-12-03 PROCEDURE — 25010000002 PROPOFOL 1000 MG/100ML EMULSION: Performed by: NURSE ANESTHETIST, CERTIFIED REGISTERED

## 2024-12-03 PROCEDURE — 25810000003 LACTATED RINGERS PER 1000 ML: Performed by: INTERNAL MEDICINE

## 2024-12-03 RX ORDER — LOSARTAN POTASSIUM 50 MG/1
50 TABLET ORAL DAILY
COMMUNITY

## 2024-12-03 RX ORDER — SODIUM CHLORIDE 0.9 % (FLUSH) 0.9 %
10 SYRINGE (ML) INJECTION AS NEEDED
Status: DISCONTINUED | OUTPATIENT
Start: 2024-12-03 | End: 2024-12-03 | Stop reason: HOSPADM

## 2024-12-03 RX ORDER — GLYCOPYRROLATE 0.2 MG/ML
INJECTION INTRAMUSCULAR; INTRAVENOUS AS NEEDED
Status: DISCONTINUED | OUTPATIENT
Start: 2024-12-03 | End: 2024-12-03 | Stop reason: SURG

## 2024-12-03 RX ORDER — SODIUM CHLORIDE, SODIUM LACTATE, POTASSIUM CHLORIDE, CALCIUM CHLORIDE 600; 310; 30; 20 MG/100ML; MG/100ML; MG/100ML; MG/100ML
30 INJECTION, SOLUTION INTRAVENOUS CONTINUOUS PRN
Status: DISCONTINUED | OUTPATIENT
Start: 2024-12-03 | End: 2024-12-03 | Stop reason: HOSPADM

## 2024-12-03 RX ORDER — SODIUM CHLORIDE 0.9 % (FLUSH) 0.9 %
10 SYRINGE (ML) INJECTION EVERY 12 HOURS SCHEDULED
Status: DISCONTINUED | OUTPATIENT
Start: 2024-12-03 | End: 2024-12-03 | Stop reason: HOSPADM

## 2024-12-03 RX ORDER — PROPOFOL 10 MG/ML
INJECTION, EMULSION INTRAVENOUS CONTINUOUS PRN
Status: DISCONTINUED | OUTPATIENT
Start: 2024-12-03 | End: 2024-12-03 | Stop reason: SURG

## 2024-12-03 RX ORDER — SODIUM CHLORIDE 9 MG/ML
40 INJECTION, SOLUTION INTRAVENOUS AS NEEDED
Status: DISCONTINUED | OUTPATIENT
Start: 2024-12-03 | End: 2024-12-03 | Stop reason: HOSPADM

## 2024-12-03 RX ORDER — LIDOCAINE HYDROCHLORIDE 20 MG/ML
INJECTION, SOLUTION INFILTRATION; PERINEURAL AS NEEDED
Status: DISCONTINUED | OUTPATIENT
Start: 2024-12-03 | End: 2024-12-03 | Stop reason: SURG

## 2024-12-03 RX ADMIN — PROPOFOL INJECTABLE EMULSION 50 MG: 10 INJECTION, EMULSION INTRAVENOUS at 09:41

## 2024-12-03 RX ADMIN — PROPOFOL INJECTABLE EMULSION 50 MG: 10 INJECTION, EMULSION INTRAVENOUS at 09:38

## 2024-12-03 RX ADMIN — LIDOCAINE HYDROCHLORIDE 50 MG: 20 INJECTION, SOLUTION INFILTRATION; PERINEURAL at 09:41

## 2024-12-03 RX ADMIN — PROPOFOL INJECTABLE EMULSION 150 MG: 10 INJECTION, EMULSION INTRAVENOUS at 09:35

## 2024-12-03 RX ADMIN — SODIUM CHLORIDE, SODIUM LACTATE, POTASSIUM CHLORIDE, CALCIUM CHLORIDE 30 ML/HR: 20; 30; 600; 310 INJECTION, SOLUTION INTRAVENOUS at 08:27

## 2024-12-03 RX ADMIN — PROPOFOL INJECTABLE EMULSION 160 MCG/KG/MIN: 10 INJECTION, EMULSION INTRAVENOUS at 09:45

## 2024-12-03 RX ADMIN — LIDOCAINE HYDROCHLORIDE 150 MG: 20 INJECTION, SOLUTION INFILTRATION; PERINEURAL at 09:35

## 2024-12-03 RX ADMIN — LIDOCAINE HYDROCHLORIDE 50 MG: 20 INJECTION, SOLUTION INFILTRATION; PERINEURAL at 09:38

## 2024-12-03 RX ADMIN — GLYCOPYRROLATE 0.2 MG: 0.2 INJECTION INTRAMUSCULAR; INTRAVENOUS at 09:32

## 2024-12-03 NOTE — ANESTHESIA PREPROCEDURE EVALUATION
Anesthesia Evaluation     Patient summary reviewed and Nursing notes reviewed                Airway   Mallampati: III  Dental      Pulmonary    (+) asthma,sleep apnea  Cardiovascular - negative cardio ROS    ECG reviewed  Rhythm: regular  Rate: normal        Neuro/Psych  (+) headaches  GI/Hepatic/Renal/Endo    (+) morbid obesity, GERD, renal disease (partial nephrectomy)-    Musculoskeletal (-) negative ROS    Abdominal    Substance History - negative use     OB/GYN negative ob/gyn ROS         Other                    Anesthesia Plan    ASA 3     MAC     intravenous induction     Anesthetic plan, risks, benefits, and alternatives have been provided, discussed and informed consent has been obtained with: patient.    CODE STATUS:

## 2024-12-03 NOTE — ANESTHESIA POSTPROCEDURE EVALUATION
Patient: Tito Jackson    Procedure Summary       Date: 12/03/24 Room / Location: [facility] ENDOSCOPY 8 / [facility] ENDOSCOPY    Anesthesia Start: 0930 Anesthesia Stop: 1018    Procedures:       ESOPHAGOGASTRODUODENOSCOPY WITH BIOPSIES AND DILATION WITH 52F KEE (Esophagus)      COLONOSCOPY TO CECUM WITH COLD BIOPSY AND COLD SNARE POLYPECTOMIES Diagnosis:       Gastroesophageal reflux disease with esophagitis and hemorrhage      Dysphagia, unspecified type      Adenomatous polyp of colon, unspecified part of colon      (Gastroesophageal reflux disease with esophagitis and hemorrhage [K21.01])      (Dysphagia, unspecified type [R13.10])      (Adenomatous polyp of colon, unspecified part of colon [D12.6])    Surgeons: Marques Gonzalez MD Provider: Kris Posada MD    Anesthesia Type: MAC ASA Status: 3            Anesthesia Type: MAC    Vitals  Vitals Value Taken Time   /77 12/03/24 1035   Temp     Pulse 87 12/03/24 1035   Resp 16 12/03/24 1035   SpO2 94 % 12/03/24 1035           Post Anesthesia Care and Evaluation    Patient location during evaluation: PACU  Patient participation: complete - patient participated  Level of consciousness: awake and alert  Pain management: adequate    Airway patency: patent  Anesthetic complications: No anesthetic complications    Cardiovascular status: acceptable  Respiratory status: acceptable  Hydration status: acceptable    Comments: --------------------            12/03/24               1035     --------------------   BP:       118/77     Pulse:      87       Resp:       16       SpO2:      94%      --------------------

## 2024-12-03 NOTE — H&P
Patient presents with    Heartburn    Difficulty Swallowing         History of Present Illness:   63 y.o. male whose wife is a pediatrician. He had an EGD (with esophageal dilation) and colonoscopy in 12/19.  I recommended a repeat colonoscopy in 3 years because of his history of colon polyps.       C/o solids dysphagia. No chest or abdominal pain. NO nausea or vomiting. No fevers, chills. He is  of a hospital in [place]. NO diarrhea or constiaption. No rectal bleeding or melena.             Medical History        Past Medical History:   Diagnosis Date    Allergic rhinitis 02/23/2016     Patient had remote allergy testing as a child but never received immunotherapy except shots for poison ivy.    Chronic migraine w/o aura w/o status migrainosus, not intractable 02/23/2016    Diverticulosis of colon 02/27/2009 02/27/2009--colonoscopy reveals multiple large and small mouth diverticula in sigmoid colon. The rest of the colon was normal.    Dyslipidemia 08/20/2015 08/20/2015--NMR reveals a total cholesterol 140. Triglycerides elevated at 203. LDL particle number excellent at 965. Small LDL particle number elevated at 747. HDL particle number low at 26.0.    Gastroesophageal reflux disease with esophagitis 02/27/2009 02/27/2009--EGD revealed LA grade a esophagitis with no evidence of bleeding. Biopsies taken. A small hiatal hernia was present. Diffuse mildly erythematous mucosa with no bleeding was found in the stomach. Biopsies taken. Biopsy sent for H. pylori. No gross lesions were noted in the entire duodenum. Random gastric biopsies returned fragments of unremarkablle gastric mucosa. Hyperplastic polypoid gastric mucosa, fundic type. H pylori negative. Distal esophagitis biopsies revealed focus of acute inflammation with evidence of mucosal erosion/ulcer. Strips of squamous epithelium with focal parakeratosis. Special stains for fungi were negative.    History of COVID-19 05/09/2022 October  2020--patient tested positive for COVID.  Mild symptoms of sore throat and headache.  No loss of taste or smell.  No residual.    History of herpes genitalis Approximately 2001     Approximately 2001--patient had an initial outbreak of genital herpes and has not had a recurrent since.    History of pneumonia 2002 08/29/2002--bronchoscopy was performed with brushings and biopsies which were negative for malignant cells. Watching showed only normal respiratory breana. AFB and fungal smears negative. The thoracic surgeon wanted to do a VATS procedure and patient refused. His adenopathy as well as infiltrate subsequently resolved. No further recurrence.  08/09/2002--CT scan of the chest performed for abnormal c    Impaired fasting glucose 08/20/2015 08/20/2015--serum glucose 109. Recommend diet, weight loss, exercise.    Migraine headaches 02/23/2016    Mild intermittent asthma 02/23/2016    Multiple environmental allergies 02/23/2016     Patient had remote allergy testing as a child but never received immunotherapy except shots for poison ivy.    Non morbid obesity 05/09/2022    Obstructive sleep apnea, not compliant with CPAP 5/9/2022     Patient not compliant with CPAP.    Vitamin D deficiency 02/23/2016            Surgical History         Past Surgical History:   Procedure Laterality Date    APPENDECTOMY   Childhood     As a child    BRONCHOSCOPY   08/29/2002 08/29/2002--bronchoscopy was performed with brushings and biopsies which were negative for malignant cells. Watching showed only normal respiratory breana. AFB and fungal smears negative. The thoracic surgeon wanted to do a VATS procedure and patient refused. His adenopathy as well as infiltrate subsequently resolved. No further recurrence.  08/09/2002--CT scan of the chest performed for abnormal c    CHOLECYSTECTOMY   07/29/2021     [facility]    COLONOSCOPY   02/27/2009 02/27/2009--colonoscopy reveals multiple large and small mouth diverticula in  sigmoid colon. The rest of the colon was normal.    COLONOSCOPY N/A 12/06/2019     Procedure: COLONOSCOPY into cecum and TI with cold biopsy polypectomies;  Surgeon: Marques Gonzalez MD;  Location: [facility] ENDOSCOPY;  Service: Gastroenterology    DACRYOCYSTORHINOSTOMY Right 07/12/2012 07/12/2012--dacryocystorhinostomy of the right eye with silicone intubation of lacrimal drainage system. Patient's symptoms have totally resolved    ENDOSCOPY N/A 12/06/2019     Procedure: ESOPHAGOGASTRODUODENOSCOPY with biopsies and dilation of esophageal stricture using 15-18 balloon;  Surgeon: Marques Gonzalez MD;  Location: [facility] ENDOSCOPY;  Service: Gastroenterology    ESOPHAGOSCOPY / EGD   02/27/2009 02/27/2009--EGD revealed LA grade a esophagitis with no evidence of bleeding. Biopsies taken. A small hiatal hernia was present. Diffuse mildly erythematous mucosa with no bleeding was found in the stomach. Biopsies taken. Biopsy sent for H. pylori. No gross lesions were noted in the entire duodenum. Random gastric biopsies returned fragments of unremarkablle gastric mucosa. Hyperplastic polypoid     LAPAROSCOPIC CHOLECYSTECTOMY   07/29/2021 July 29, 2021--laparoscopic cholecystectomy with cholangiogram.    NEPHRECTOMY PARTIAL Left 09/09/2021     Procedure: NEPHRECTOMY PARTIAL LAPAROSCOPIC WITH DAVINCI ROBOT;  Surgeon: Jarad Abrams Jr., MD;  Location: [facility] OR;  Service: Robotics - CS-[employer]inc[employer];  Laterality: Left;    SKIN SURGERY   12/15/2008     12/15/2008--patient had a total of 7 lesions removed from his face and clavicle. Pathology returned seborrheic keratoses inflamed on the clavicle.    TONSILLECTOMY   Childhood     As a child              Current Medications      Current Outpatient Medications:     albuterol (PROVENTIL HFA;VENTOLIN HFA) 108 (90 BASE) MCG/ACT inhaler, Proventil  (90 Base) MCG/ACT Inhalation Aerosol Solution; Patient Sig: Proventil  (90 Base) MCG/ACT Inhalation Aerosol Solution 1-2  puffs every 4 hours when necessary asthma; 1; 5; 23-Jan-2014; Active, Disp: , Rfl:     dexlansoprazole (DEXILANT) 60 MG capsule, Take 1 capsule by mouth Daily., Disp: , Rfl:     fluticasone (FLONASE) 50 MCG/ACT nasal spray, INSTILL 1 SPRAY IN EACH NOSTRIL EVERY DAY, Disp: , Rfl:     omeprazole (priLOSEC) 40 MG capsule, Take 1 capsule by mouth Daily. (Patient not taking: Reported on 5/23/2024), Disp: 90 capsule, Rfl: 1        Allergies   No Known Allergies              Family History   Problem Relation Age of Onset    Diabetes Father           Type 2    Malig Hyperthermia Neg Hx           Social History   Social History            Socioeconomic History    Marital status:        Spouse name: adan    Highest education level: Master's degree (e.g., MA, MS, Janette, MEd, MSW, CAROL)   Tobacco Use    Smoking status: Never    Smokeless tobacco: Never   Vaping Use    Vaping status: Never Used   Substance and Sexual Activity    Alcohol use: No    Drug use: No    Sexual activity: Defer            Review of Systems   Gastrointestinal:  Negative for abdominal pain.   All other systems reviewed and are negative.     Pertinent positives and negatives documented in the HPI and all other systems reviewed and were found to be negative.      Vitals:     05/23/24 0827   BP: 129/74   Pulse: 84   Temp: 98 °F (36.7 °C)         Physical Exam  Vitals reviewed.   Constitutional:       General: He is not in acute distress.     Appearance: Normal appearance. He is well-developed. He is not diaphoretic.   HENT:      Head: Normocephalic and atraumatic. Hair is normal.      Right Ear: Hearing, tympanic membrane, ear canal and external ear normal.      Left Ear: Hearing, tympanic membrane, ear canal and external ear normal.      Nose: Nose normal. No nasal deformity.      Mouth/Throat:      Mouth: Mucous membranes are moist. No oral lesions.      Pharynx: Uvula midline. No uvula swelling.   Eyes:      General: Lids are normal. No scleral  icterus.        Right eye: No discharge.         Left eye: No discharge.      Extraocular Movements: Extraocular movements intact.      Right eye: Normal extraocular motion and no nystagmus.      Left eye: Normal extraocular motion and no nystagmus.      Conjunctiva/sclera: Conjunctivae normal.      Pupils: Pupils are equal, round, and reactive to light.   Neck:      Thyroid: No thyromegaly.      Vascular: No JVD.   Cardiovascular:      Rate and Rhythm: Normal rate and regular rhythm.      Pulses: Normal pulses.      Heart sounds: Normal heart sounds. No murmur heard.     No gallop.   Pulmonary:      Effort: Pulmonary effort is normal. No respiratory distress.      Breath sounds: Normal breath sounds. No wheezing or rales.   Chest:      Chest wall: No tenderness.   Abdominal:      General: Bowel sounds are normal. There is no distension.      Palpations: Abdomen is soft. There is no mass.      Tenderness: There is no abdominal tenderness. There is no guarding.      Hernia: No hernia is present.   Musculoskeletal:         General: No tenderness or deformity. Normal range of motion.      Cervical back: Normal range of motion and neck supple.   Lymphadenopathy:      Cervical: No cervical adenopathy.   Skin:     General: Skin is warm and dry.      Findings: No rash.   Neurological:      Mental Status: He is alert and oriented to person, place, and time.      Cranial Nerves: No cranial nerve deficit.      Motor: No abnormal muscle tone.      Coordination: Coordination normal.      Deep Tendon Reflexes: Reflexes are normal and symmetric. Reflexes normal.   Psychiatric:         Mood and Affect: Mood normal.         Behavior: Behavior normal.         Thought Content: Thought content normal.         Judgment: Judgment normal.            Diagnoses and all orders for this visit:     1. Gastroesophageal reflux disease with esophagitis and hemorrhage (Primary)  -     Case Request; Standing  -     Case Request  -     CBC &  Differential  -     Comprehensive Metabolic Panel     2. Dysphagia, unspecified type  -     Case Request; Standing  -     Case Request  -     CBC & Differential  -     Comprehensive Metabolic Panel     3. Adenomatous polyp of colon, unspecified part of colon  -     Case Request; Standing  -     Case Request  -     CBC & Differential  -     Comprehensive Metabolic Panel     Other orders  -     Implement Anesthesia orders day of procedure.; Standing  -     Follow Anesthesia Guidelines / Protocol; Future  -     Verify bowel prep was successful; Standing  -     Give tap water enema if bowel prep was insufficient; Standing        Assessment:  History of colon polyps.  His last colonoscopy was in 12 of 2019.  At that time I recommended a repeat colonoscopy in 3 years.  Dysphagia        Recommendations:  EGD and colonoscopy  Labs     No follow-ups on file.     12/3/24 - No change from the above H and P.    Marques Gonzalez MD

## 2024-12-03 NOTE — DISCHARGE INSTRUCTIONS

## 2024-12-04 LAB
CYTO UR: NORMAL
LAB AP CASE REPORT: NORMAL
PATH REPORT.FINAL DX SPEC: NORMAL
PATH REPORT.GROSS SPEC: NORMAL

## 2025-01-02 ENCOUNTER — TELEPHONE (OUTPATIENT)
Dept: GASTROENTEROLOGY | Facility: CLINIC | Age: 65
End: 2025-01-02
Payer: COMMERCIAL

## 2025-01-02 NOTE — TELEPHONE ENCOUNTER
----- Message from Marques Gonzalez sent at 12/30/2024  2:36 PM EST -----  12/30/24       Tell him that pathology from his recent EGD showed mild inflammation of the stomach but no evidence of Helicobacter pylori.  The distal esophageal biopsies showed mild chronic inflammation.  I hope that he can swallow better since we stretched his esophagus?       Tell him that pathology from the colonoscopy showed that the colon polyps were not cancerous but some were precancerous.  Recommend that he have a repeat colonoscopy in 3 years.  Please send a copy of this report to his PCP.  Kodak schmitt

## 2025-01-02 NOTE — TELEPHONE ENCOUNTER
Called pt and left  for pt to call back.     C/s placed in [facility] and  for 12/03/2027.    Results sent to pcp thru [facility].

## 2025-01-03 ENCOUNTER — TELEPHONE (OUTPATIENT)
Dept: GASTROENTEROLOGY | Facility: CLINIC | Age: 65
End: 2025-01-03

## 2025-01-03 NOTE — TELEPHONE ENCOUNTER
"        Hub staff attempted to follow warm transfer process and was unsuccessful     Caller: Tito Jackson \"KYUNG\"    Relationship to patient: Self    Best call back number: [phone]    Patient is needing: PT IS RETURNING PHONE CALL FROM ESTELLA LAWTON FOR TEST RESULTS.          "

## 2025-02-24 NOTE — PROGRESS NOTES
Informed pt.   Patient would like to schedule a colonoscopy, Dr. Castillo patient, please call patient at [phone] [facility]/sc

## (undated) DEVICE — ADAPT CLN BIOGUARD AIR/H2O DISP

## (undated) DEVICE — SENSR O2 OXIMAX FNGR A/ 18IN NONSTR

## (undated) DEVICE — LOU GENERAL ROBOT: Brand: MEDLINE INDUSTRIES, INC.

## (undated) DEVICE — 3M™ STERI-DRAPE™ INSTRUMENT POUCH 1018L: Brand: STERI-DRAPE™

## (undated) DEVICE — LAPAROSCOPIC SMOKE FILTRATION SYSTEM: Brand: PALL LAPAROSHIELD® PLUS LAPAROSCOPIC SMOKE FILTRATION SYSTEM

## (undated) DEVICE — CANNULA SEAL

## (undated) DEVICE — KT ORCA ORCAPOD DISP STRL

## (undated) DEVICE — SINGLE-USE BIOPSY FORCEPS: Brand: RADIAL JAW 4

## (undated) DEVICE — SUT VIC 0 CT1 36IN J946H

## (undated) DEVICE — ENDOPOUCH RETRIEVER SPECIMEN RETRIEVAL BAGS: Brand: ENDOPOUCH RETRIEVER

## (undated) DEVICE — UNDYED BRAIDED (POLYGLACTIN 910), SYNTHETIC ABSORBABLE SUTURE: Brand: COATED VICRYL

## (undated) DEVICE — LN SMPL CO2 SHTRM SD STREAM W/M LUER

## (undated) DEVICE — VIOLET POLYDIOXANONE POLYMER, SYNTHETIC ABSORBABLE SUTURE CLIPS: Brand: LAPRA-TY

## (undated) DEVICE — BITEBLOCK OMNI BLOC

## (undated) DEVICE — THE TORRENT IRRIGATION SCOPE CONNECTOR IS USED WITH THE TORRENT IRRIGATION TUBING TO PROVIDE IRRIGATION FLUIDS SUCH AS STERILE WATER DURING GASTROINTESTINAL ENDOSCOPIC PROCEDURES WHEN USED IN CONJUNCTION WITH AN IRRIGATION PUMP (OR ELECTROSURGICAL UNIT).: Brand: TORRENT

## (undated) DEVICE — ANTIBACTERIAL UNDYED BRAIDED (POLYGLACTIN 910), SYNTHETIC ABSORBABLE SUTURE: Brand: COATED VICRYL

## (undated) DEVICE — DEV INFL ALLIANCE2 SYS

## (undated) DEVICE — BLADELESS OBTURATOR: Brand: WECK VISTA

## (undated) DEVICE — TUBING, SUCTION, 1/4" X 10', STRAIGHT: Brand: MEDLINE

## (undated) DEVICE — APPL HEMOS FOR DELIVERY FLOSEAL

## (undated) DEVICE — GLV SURG BIOGEL LTX PF 7

## (undated) DEVICE — SOL ANTISTICK CAUTRY ELECTROLUBE LF

## (undated) DEVICE — MSK PROC CURAPLEX O2 2/ADAPT 7FT

## (undated) DEVICE — SNAR POLYP CAPTIVATOR RND STFF 2.4 240CM 10MM 1P/U

## (undated) DEVICE — MEDI-VAC YANK SUCT HNDL W/TPRD BULBOUS TIP: Brand: CARDINAL HEALTH

## (undated) DEVICE — ARM DRAPE

## (undated) DEVICE — SOL NACL 0.9PCT 1000ML

## (undated) DEVICE — BLCK/BITE BLOX W/DENTL/RIM W/STRAP 54F

## (undated) DEVICE — KT VLV BIOGUARD SXN BIOP AIR/H20 CONN 4PC DISP

## (undated) DEVICE — THE SINGLE USE ETRAP – POLYP TRAP IS USED FOR SUCTION RETRIEVAL OF ENDOSCOPICALLY REMOVED POLYPS.: Brand: ETRAP

## (undated) DEVICE — ENDOPATH XCEL BLADELESS TROCARS WITH STABILITY SLEEVES: Brand: ENDOPATH XCEL

## (undated) DEVICE — SUT SILK 2/0 FS BLK 18IN 685G

## (undated) DEVICE — TIP COVER ACCESSORY

## (undated) DEVICE — JACKSON-PRATT 100CC BULB RESERVOIR: Brand: CARDINAL HEALTH

## (undated) DEVICE — STPLR SKIN VISISTAT WD 35CT

## (undated) DEVICE — FRCP BX RADJAW4 NDL 2.8 240CM LG OG BX40

## (undated) DEVICE — COLUMN DRAPE

## (undated) DEVICE — 30977 SEE SHARP - ENHANCED INTRAOPERATIVE LAPAROSCOPE CLEANING & DEFOGGING: Brand: 30977 SEE SHARP - ENHANCED INTRAOPERATIVE LAPAROSCOPE CLEANING & DEFOGGING

## (undated) DEVICE — BALN DIL ESOPH CRE CONTRL RADL 15/18MM 8CM BX5

## (undated) DEVICE — Device

## (undated) DEVICE — CANN NASL CO2 TRULINK W/O2 A/